# Patient Record
Sex: MALE | Race: WHITE | NOT HISPANIC OR LATINO | Employment: FULL TIME | URBAN - METROPOLITAN AREA
[De-identification: names, ages, dates, MRNs, and addresses within clinical notes are randomized per-mention and may not be internally consistent; named-entity substitution may affect disease eponyms.]

---

## 2018-10-05 ENCOUNTER — OFFICE VISIT (OUTPATIENT)
Dept: FAMILY MEDICINE | Facility: CLINIC | Age: 28
End: 2018-10-05
Payer: COMMERCIAL

## 2018-10-05 VITALS
BODY MASS INDEX: 23.86 KG/M2 | RESPIRATION RATE: 14 BRPM | DIASTOLIC BLOOD PRESSURE: 66 MMHG | HEIGHT: 73 IN | OXYGEN SATURATION: 98 % | HEART RATE: 62 BPM | TEMPERATURE: 98.2 F | WEIGHT: 180 LBS | SYSTOLIC BLOOD PRESSURE: 110 MMHG

## 2018-10-05 DIAGNOSIS — Z00.00 ROUTINE HISTORY AND PHYSICAL EXAMINATION OF ADULT: Primary | ICD-10-CM

## 2018-10-05 DIAGNOSIS — Z23 NEED FOR PROPHYLACTIC VACCINATION AND INOCULATION AGAINST INFLUENZA: ICD-10-CM

## 2018-10-05 PROCEDURE — 99385 PREV VISIT NEW AGE 18-39: CPT | Mod: 25 | Performed by: FAMILY MEDICINE

## 2018-10-05 PROCEDURE — 86780 TREPONEMA PALLIDUM: CPT | Performed by: FAMILY MEDICINE

## 2018-10-05 PROCEDURE — 90686 IIV4 VACC NO PRSV 0.5 ML IM: CPT | Performed by: FAMILY MEDICINE

## 2018-10-05 PROCEDURE — 87389 HIV-1 AG W/HIV-1&-2 AB AG IA: CPT | Performed by: FAMILY MEDICINE

## 2018-10-05 PROCEDURE — 90471 IMMUNIZATION ADMIN: CPT | Performed by: FAMILY MEDICINE

## 2018-10-05 PROCEDURE — 87491 CHLMYD TRACH DNA AMP PROBE: CPT | Performed by: FAMILY MEDICINE

## 2018-10-05 PROCEDURE — 36415 COLL VENOUS BLD VENIPUNCTURE: CPT | Performed by: FAMILY MEDICINE

## 2018-10-05 PROCEDURE — 82947 ASSAY GLUCOSE BLOOD QUANT: CPT | Performed by: FAMILY MEDICINE

## 2018-10-05 PROCEDURE — 80061 LIPID PANEL: CPT | Performed by: FAMILY MEDICINE

## 2018-10-05 PROCEDURE — 87591 N.GONORRHOEAE DNA AMP PROB: CPT | Performed by: FAMILY MEDICINE

## 2018-10-05 NOTE — PATIENT INSTRUCTIONS
Preventive Health Recommendations  Male Ages 26 - 39    Yearly exam:             See your health care provider every year in order to  o   Review health changes.   o   Discuss preventive care.    o   Review your medicines if your doctor has prescribed any.    You should be tested each year for STDs (sexually transmitted diseases), if you re at risk.     After age 35, talk to your provider about cholesterol testing. If you are at risk for heart disease, have your cholesterol tested at least every 5 years.     If you are at risk for diabetes, you should have a diabetes test (fasting glucose).  Shots: Get a flu shot each year. Get a tetanus shot every 10 years.     Nutrition:    Eat at least 5 servings of fruits and vegetables daily.     Eat whole-grain bread, whole-wheat pasta and brown rice instead of white grains and rice.     Get adequate Calcium and Vitamin D.     Lifestyle    Exercise for at least 150 minutes a week (30 minutes a day, 5 days a week). This will help you control your weight and prevent disease.     Limit alcohol to one drink per day.     No smoking.     Wear sunscreen to prevent skin cancer.     See your dentist every six months for an exam and cleaning.   Consider Hep B vaccine if you haven't had it yet.

## 2018-10-05 NOTE — MR AVS SNAPSHOT
After Visit Summary   10/5/2018    Mike Cisneros    MRN: [id number]           Patient Information     Date Of Birth          1990        Visit Information        Provider Department      10/5/2018 2:00 PM Eze Bronson MD [facility]        Today's Diagnoses     Routine history and physical examination of adult    -  1    Need for prophylactic vaccination and inoculation against influenza          Care Instructions      Preventive Health Recommendations  Male Ages 26 - 39    Yearly exam:             See your health care provider every year in order to  o   Review health changes.   o   Discuss preventive care.    o   Review your medicines if your doctor has prescribed any.    You should be tested each year for STDs (sexually transmitted diseases), if you re at risk.     After age 35, talk to your provider about cholesterol testing. If you are at risk for heart disease, have your cholesterol tested at least every 5 years.     If you are at risk for diabetes, you should have a diabetes test (fasting glucose).  Shots: Get a flu shot each year. Get a tetanus shot every 10 years.     Nutrition:    Eat at least 5 servings of fruits and vegetables daily.     Eat whole-grain bread, whole-wheat pasta and brown rice instead of white grains and rice.     Get adequate Calcium and Vitamin D.     Lifestyle    Exercise for at least 150 minutes a week (30 minutes a day, 5 days a week). This will help you control your weight and prevent disease.     Limit alcohol to one drink per day.     No smoking.     Wear sunscreen to prevent skin cancer.     See your dentist every six months for an exam and cleaning.   Consider Hep B vaccine if you haven't had it yet.          Follow-ups after your visit        Who to contact     If you have questions or need follow up information about today's clinic visit or your schedule please contact [facility] directly at  "[phone].  Normal or non-critical lab and imaging results will be communicated to you by MyChart, letter or phone within 4 business days after the clinic has received the results. If you do not hear from us within 7 days, please contact the clinic through [employer] or phone. If you have a critical or abnormal lab result, we will notify you by phone as soon as possible.  Submit refill requests through [employer] or call your pharmacy and they will forward the refill request to us. Please allow 3 business days for your refill to be completed.          Additional Information About Your Visit        [employer] Information     [employer] gives you secure access to your electronic health record. If you see a primary care provider, you can also send messages to your care team and make appointments. If you have questions, please call your primary care clinic.  If you do not have a primary care provider, please call [phone] and they will assist you.        Care EveryWhere ID     This is your Care EveryWhere ID. This could be used by other organizations to access your [place] medical records  [id number]        Your Vitals Were     Pulse Temperature Respirations Height Pulse Oximetry BMI (Body Mass Index)    62 98.2  F (36.8  C) (Oral) 14 6' 1\" (1.854 m) 98% 23.75 kg/m2       Blood Pressure from Last 3 Encounters:   10/05/18 110/66    Weight from Last 3 Encounters:   10/05/18 180 lb (81.6 kg)              We Performed the Following     Chlamydia trachomatis PCR     FLU VACCINE, SPLIT VIRUS, IM (QUADRIVALENT) [11406]- >3 YRS     Glucose     HIV Antigen Antibody Combo     Lipid panel reflex to direct LDL Fasting     Neisseria gonorrhoeae PCR     Treponema Abs w Reflex to RPR and Titer     Vaccine Administration, Initial [[id number]]        Primary Care Provider Office Phone # Fax #    Eze Damon [phone] [address] [facility] [address]        Equal Access to Services     EDGAR JC AH: " Hadii cheryle Chilel, warosaleeda luqadaha, qajaspalta kaflex billynila, luis enrique chuck joshuamanju cervantessabrina michellerenettanasir sánchezraminmanju valencia. So [facility] [phone].    ATENCIÓN: Si habla español, tiene a moran disposición servicios gratuitos de asistencia lingüística. Llame al [phone].    We comply with applicable federal civil rights laws and [place] laws. We do not discriminate on the basis of race, color, national origin, age, disability, sex, sexual orientation, or gender identity.            Thank you!     Thank you for choosing [facility]  for your care. Our goal is always to provide you with excellent care. Hearing back from our patients is one way we can continue to improve our services. Please take a few minutes to complete the written survey that you may receive in the mail after your visit with us. Thank you!             Your Updated Medication List - Protect others around you: Learn how to safely use, store and throw away your medicines at www.disposemymeds.org.      Notice  As of 10/5/2018  4:43 PM    You have not been prescribed any medications.

## 2018-10-05 NOTE — PROGRESS NOTES
SUBJECTIVE:   CC: Mike Cisneros is an 27 year old male who presents for preventative health visit.     Physical   Annual:     Getting at least 3 servings of Calcium per day:  NO    Bi-annual eye exam:  Yes    Dental care twice a year:  Yes    Sleep apnea or symptoms of sleep apnea:  None    Diet:  Regular (no restrictions)    Frequency of exercise:  2-3 days/week    Duration of exercise:  15-30 minutes    Taking medications regularly:  Yes    Medication side effects:  Not applicable and None    Additional concerns today:  YES    Physical Activity: Working a new job. Has gained 7 lbs in the past 3 months. Used to be exercising a lot more than he is now.  Nutrition: Due to work travel demands he has struggled with nutrition.     Today's PHQ-2 Score:   PHQ-2 ( 1999 Pf[employer]) 10/5/2018   Q1: Little interest or pleasure in doing things 0   Q2: Feeling down, depressed or hopeless 1   PHQ-2 Score 1   Q1: Little interest or pleasure in doing things Not at all   Q2: Feeling down, depressed or hopeless Several days   PHQ-2 Score 1       Abuse: Current or Past(Physical, Sexual or Emotional)- No  Do you feel safe in your environment - Yes    Social History   Substance Use Topics     Smoking status: Never Smoker     Smokeless tobacco: Never Used     Alcohol use Yes     Alcohol Use 10/5/2018   If you drink alcohol do you typically have greater than 3 drinks per day OR greater than 7 drinks per week? -   AUDIT SCORE  5       Last PSA: No results found for: PSA    Reviewed orders with patient. Reviewed health maintenance and updated orders accordingly - Yes    Reviewed and updated as needed this visit by clinical staff  Tobacco  Allergies  Meds  Med Hx  Surg Hx  Fam Hx  Soc Hx        Reviewed and updated as needed this visit by Provider          Review of Systems  CONSTITUTIONAL: NEGATIVE for fever, chills, change in weight  INTEGUMENTARY/SKIN: NEGATIVE for worrisome rashes, moles or lesions. 3mm mole on back that is darker  "than others, but regular borders, regular coloring.  EYES: NEGATIVE for vision changes or irritation  ENT: NEGATIVE for ear, mouth and throat problems  RESP: NEGATIVE for significant cough or SOB  CV: NEGATIVE for chest pain, palpitations or peripheral edema  GI: NEGATIVE for nausea, abdominal pain, heartburn, or change in bowel habits   male: negative for dysuria, hematuria, decreased urinary stream, erectile dysfunction, urethral discharge  MUSCULOSKELETAL: NEGATIVE for significant arthralgias or myalgia  NEURO: NEGATIVE for weakness, dizziness or paresthesias  PSYCHIATRIC: NEGATIVE for changes in mood or affect - has had depression and anxiety in past. Citalopram helped in the past, feels he is doing well. Job has helped his mental health he thinks.    OBJECTIVE:   /66 (BP Location: Left arm, Patient Position: Sitting, Cuff Size: Adult Regular)  Pulse 62  Temp 98.2  F (36.8  C) (Oral)  Resp 14  Ht 6' 1\" (1.854 m)  Wt 180 lb (81.6 kg)  SpO2 98%  BMI 23.75 kg/m2    Physical Exam  GENERAL: healthy, alert and no distress  EYES: Eyes grossly normal to inspection, PERRL and conjunctivae and sclerae normal  HENT: ear canals and TM's normal, nose and mouth without ulcers or lesions  NECK: no adenopathy, no asymmetry, masses, or scars and thyroid normal to palpation  RESP: lungs clear to auscultation - no rales, rhonchi or wheezes  CV: regular rate and rhythm, normal S1 S2, no S3 or S4, no murmur, click or rub, no peripheral edema and peripheral pulses strong  ABDOMEN: soft, nontender, no hepatosplenomegaly, no masses and bowel sounds normal   (male): normal male genitalia without lesions or urethral discharge, no hernia  MS: no gross musculoskeletal defects noted, no edema  SKIN: no suspicious lesions or rashes  NEURO: Normal strength and tone, mentation intact and speech normal  PSYCH: mentation appears normal, affect normal/bright      ASSESSMENT/PLAN:   Mike was seen today for physical and flu " "shot.    Diagnoses and all orders for this visit:    Routine history and physical examination of adult  -     Lipid panel reflex to direct LDL Fasting  -     Glucose  -     HIV Antigen Antibody Combo  -     Neisseria gonorrhoeae PCR  -     Chlamydia trachomatis PCR  -     Treponema Abs w Reflex to RPR and Titer    Need for prophylactic vaccination and inoculation against influenza  -     FLU VACCINE, SPLIT VIRUS, IM (QUADRIVALENT) [84458]- >3 YRS  -     Vaccine Administration, Initial [[id number]]        COUNSELING:   Reviewed preventive health counseling, as reflected in patient instructions       Regular exercise       Healthy diet/nutrition    BP Readings from Last 1 Encounters:   10/05/18 110/66     Estimated body mass index is 23.75 kg/(m^2) as calculated from the following:    Height as of this encounter: 6' 1\" (1.854 m).    Weight as of this encounter: 180 lb (81.6 kg).    Consider Hep B vaccine if you haven't had it yet.       reports that he has never smoked. He has never used smokeless tobacco.      Counseling Resources:  ATP IV Guidelines  Pooled Cohorts Equation Calculator  FRAX Risk Assessment  ICSI Preventive Guidelines  Dietary Guidelines for Americans, 2010  [employer] MyPlate  ASA Prophylaxis  Lung CA Screening    Eze Damon MD  [facility]  Answers for HPI/ROS submitted by the patient on 10/5/2018   PHQ-2 Score: 1    "

## 2018-10-05 NOTE — PROGRESS NOTES

## 2018-10-06 LAB
CHOLEST SERPL-MCNC: 183 MG/DL
GLUCOSE SERPL-MCNC: 82 MG/DL (ref 70–99)
HDLC SERPL-MCNC: 46 MG/DL
LDLC SERPL CALC-MCNC: 125 MG/DL
NONHDLC SERPL-MCNC: 137 MG/DL
T PALLIDUM AB SER QL: NONREACTIVE
TRIGL SERPL-MCNC: 58 MG/DL

## 2018-10-06 ASSESSMENT — PATIENT HEALTH QUESTIONNAIRE - PHQ9: SUM OF ALL RESPONSES TO PHQ QUESTIONS 1-9: 4

## 2018-10-07 LAB
C TRACH DNA SPEC QL NAA+PROBE: NEGATIVE
N GONORRHOEA DNA SPEC QL NAA+PROBE: NEGATIVE
SPECIMEN SOURCE: NORMAL
SPECIMEN SOURCE: NORMAL

## 2018-10-08 LAB — HIV 1+2 AB+HIV1 P24 AG SERPL QL IA: NONREACTIVE

## 2018-11-26 ENCOUNTER — MEDICAL CORRESPONDENCE (OUTPATIENT)
Dept: HEALTH INFORMATION MANAGEMENT | Facility: CLINIC | Age: 28
End: 2018-11-26

## 2019-01-07 ENCOUNTER — TELEPHONE (OUTPATIENT)
Dept: FAMILY MEDICINE | Facility: CLINIC | Age: 29
End: 2019-01-07

## 2019-01-07 ENCOUNTER — OFFICE VISIT (OUTPATIENT)
Dept: URGENT CARE | Facility: URGENT CARE | Age: 29
End: 2019-01-07
Payer: COMMERCIAL

## 2019-01-07 VITALS
HEART RATE: 73 BPM | DIASTOLIC BLOOD PRESSURE: 68 MMHG | TEMPERATURE: 97.4 F | OXYGEN SATURATION: 99 % | SYSTOLIC BLOOD PRESSURE: 112 MMHG

## 2019-01-07 DIAGNOSIS — B27.90 MONONUCLEOSIS: Primary | ICD-10-CM

## 2019-01-07 LAB
BASOPHILS # BLD AUTO: 0 10E9/L (ref 0–0.2)
BASOPHILS NFR BLD AUTO: 0.4 %
DEPRECATED S PYO AG THROAT QL EIA: NORMAL
DIFFERENTIAL METHOD BLD: ABNORMAL
EOSINOPHIL # BLD AUTO: 0 10E9/L (ref 0–0.7)
EOSINOPHIL NFR BLD AUTO: 0.3 %
ERYTHROCYTE [DISTWIDTH] IN BLOOD BY AUTOMATED COUNT: 12.4 % (ref 10–15)
HCT VFR BLD AUTO: 37.4 % (ref 40–53)
HETEROPH AB SER QL: POSITIVE
HGB BLD-MCNC: 12.7 G/DL (ref 13.3–17.7)
LYMPHOCYTES # BLD AUTO: 5.2 10E9/L (ref 0.8–5.3)
LYMPHOCYTES NFR BLD AUTO: 48.2 %
MCH RBC QN AUTO: 29.2 PG (ref 26.5–33)
MCHC RBC AUTO-ENTMCNC: 34 G/DL (ref 31.5–36.5)
MCV RBC AUTO: 86 FL (ref 78–100)
MONOCYTES # BLD AUTO: 1.6 10E9/L (ref 0–1.3)
MONOCYTES NFR BLD AUTO: 14.9 %
NEUTROPHILS # BLD AUTO: 3.9 10E9/L (ref 1.6–8.3)
NEUTROPHILS NFR BLD AUTO: 36.2 %
PLATELET # BLD AUTO: 171 10E9/L (ref 150–450)
RBC # BLD AUTO: 4.35 10E12/L (ref 4.4–5.9)
SPECIMEN SOURCE: NORMAL
WBC # BLD AUTO: 10.7 10E9/L (ref 4–11)

## 2019-01-07 PROCEDURE — 87081 CULTURE SCREEN ONLY: CPT | Performed by: PHYSICIAN ASSISTANT

## 2019-01-07 PROCEDURE — 99213 OFFICE O/P EST LOW 20 MIN: CPT | Performed by: PHYSICIAN ASSISTANT

## 2019-01-07 PROCEDURE — 86308 HETEROPHILE ANTIBODY SCREEN: CPT | Performed by: PHYSICIAN ASSISTANT

## 2019-01-07 PROCEDURE — 36415 COLL VENOUS BLD VENIPUNCTURE: CPT | Performed by: PHYSICIAN ASSISTANT

## 2019-01-07 PROCEDURE — 85025 COMPLETE CBC W/AUTO DIFF WBC: CPT | Performed by: PHYSICIAN ASSISTANT

## 2019-01-07 PROCEDURE — 87880 STREP A ASSAY W/OPTIC: CPT | Performed by: PHYSICIAN ASSISTANT

## 2019-01-07 NOTE — TELEPHONE ENCOUNTER
S-(situation): patient calling for appointment     B-(background): sore throat onset 1 1/2 weeks ago     A-(assessment):   Sore throat - painful to swallow however is able to swallow   No breathing difficulties   Fatigue   Swollen glands neck/throat   Started with fever 1 and 1/2 weeks ago this seems to have resolved   Taking ibuprofen for symptoms   Patient would like mono test     R-(recommendations): No appointments open in clinic today   Patient will f/u in urgent care setting today - either [place] or [place], does not wish to wait for [facility] to open at 5:00    Robina Kolb, Registered Nurse   [facility]

## 2019-01-07 NOTE — PROGRESS NOTES
SUBJECTIVE:  Mike Cisneros is a 28 year old male with a chief complaint of sore throat.  Onset of symptoms was 10 day(s) ago.    Course of illness: worsening.  Severity moderate  Current and Associated symptoms: fever started a couple days ago. Endorses fatigue  Treatment measures tried include Tylenol/Ibuprofen.  Predisposing factors include None.  Patient denies inability to swallow liquids, drooling, no breathing difficulties or unable to open mouth.     Past Medical History:   Diagnosis Date     Anxiety and depression      No current outpatient medications on file.     Social History     Tobacco Use     Smoking status: Never Smoker     Smokeless tobacco: Never Used   Substance Use Topics     Alcohol use: Yes       ROS:  Review of systems otherwise negative except as stated above.    OBJECTIVE:   /68 (BP Location: Right arm, Patient Position: Chair, Cuff Size: Adult Regular)   Pulse 73   Temp 97.4  F (36.3  C) (Tympanic)   SpO2 99%   GENERAL APPEARANCE: healthy, alert and no distress  EYES: EOMI,  PERRL, conjunctiva clear  HENT: ear canals and TM's normal.  Nose normal.  Pharynx erythematous with some exudate noted.  NECK: supple. Shotty lymphnodes B/L R>L  RESP: lungs clear to auscultation - no rales, rhonchi or wheezes  CV: regular rates and rhythm, normal S1 S2, no murmur noted  ABDOMEN:  soft, nontender, no HSM or masses and bowel sounds normal  SKIN: no suspicious lesions or rashes    Results for orders placed or performed in visit on 01/07/19   CBC with platelets and differential   Result Value Ref Range    WBC 10.7 4.0 - 11.0 10e9/L    RBC Count 4.35 (L) 4.4 - 5.9 10e12/L    Hemoglobin 12.7 (L) 13.3 - 17.7 g/dL    Hematocrit 37.4 (L) 40.0 - 53.0 %    MCV 86 78 - 100 fl    MCH 29.2 26.5 - 33.0 pg    MCHC 34.0 31.5 - 36.5 g/dL    RDW 12.4 10.0 - 15.0 %    Platelet Count 171 150 - 450 10e9/L    Diff Method Automated Method     % Neutrophils 36.2 %    % Lymphocytes 48.2 %    % Monocytes 14.9 %    %  Eosinophils 0.3 %    % Basophils 0.4 %    Absolute Neutrophil 3.9 1.6 - 8.3 10e9/L    Absolute Lymphocytes 5.2 0.8 - 5.3 10e9/L    Absolute Monocytes 1.6 (H) 0.0 - 1.3 10e9/L    Absolute Eosinophils 0.0 0.0 - 0.7 10e9/L    Absolute Basophils 0.0 0.0 - 0.2 10e9/L   Mononucleosis screen   Result Value Ref Range    Mononucleosis Screen Positive (A) NEG^Negative   Strep, Rapid Screen   Result Value Ref Range    Specimen Description Throat     Rapid Strep A Screen       NEGATIVE: No Group A streptococcal antigen detected by immunoassay, await culture report.         ASSESSMENT / PLAN:  1. Mononucleosis  Symptomatic treat with gargles, lozenges, and OTC analgesic as needed. Follow-up with primary clinic if not improving in 2-3 weeks. Avoid contact sports for the next 4 weeks. Dicussed course of illness with mononucleosis.     I have discussed the patient's diagnosis and my plan of treatment with the patient. We went over any labs or imaging. Patient is aware to come back in with worsening symptoms or if no relief despite treatment plan.  Patient verbalizes understanding. All questions were addressed and answered.   Fiona Acosta PA-C

## 2019-01-07 NOTE — LETTER
[facility]  [address]  [address]  [place] [address]  Phone: [phone]  Fax: [phone]    January 7, 2019        Mike Cisneros  [address]  [address]  [place] [address]          To whom it may concern:    RE: Mike Cisneros    Patient was seen and treated today at our clinic. Please refrain from airline travel from dates 1/7/2018 - 1/15/2018.     Please contact me for questions or concerns.      Sincerely,        Fiona Acosta PA-C

## 2019-01-07 NOTE — PATIENT INSTRUCTIONS
nal medical care. Always follow your healthcare professional's instructions.           Patient Education     Mononucleosis  Mononucleosis (also called mono) is a contagious viral infection. Most infants and children exposed to the virus get only mild flu-like symptoms or no symptoms at all. However, infection is usually more serious in teens and young adults. While the virus is active, it causes symptoms and can spread to others. After symptoms subside, the virus stays in the body and eventually becomes inactive. The virus can reactivate and develop symptoms, especially in people with weak immune systems.   The virus is usually spread by contact with saliva, often by kissing, or sharing food or eating utensils. It may also spread by breastmilk, blood, or sexual contact. It takes about 4 to 6 weeks to develop symptoms after exposure.  Early symptoms include headache, nausea, tiredness and general muscle aching. This is followed by sore throat and fever. Lymph glands in the neck, under the arms, or in the groin may be swollen. Symptoms usually go away in about 1 to 2 months. But they can last up to 4 months.  In the first few days to weeks, a common blood test (the monospot test) used to diagnose this disease may be negative even though you have the illness. In this case, other tests may be done.  Taking the antibiotics ampicillin or amoxicillin during a mono infection may cause a skin rash. This is not serious and will fade in about a week. The rash may not mean you are having an allergic reaction to the antibiotic.   Mono can cause your spleen to swell. The spleen is a fist-sized organ in the upper left abdomen that stores red blood cells. Injury to a swollen spleen can cause the spleen to rupture. This can cause life-threatening internal bleeding. To prevent this from happening, don't play contact sports or do strenuous activity for 8 weeks, or until your healthcare provider says it's OK. A sharp blow or pressure  to the area could rupture a swollen spleen  Home care    Rest in bed until the fever and weakness have gone away.    Drink plenty of fluids, but don't drink alcohol. Otherwise, you may eat a regular diet.    Ask your healthcare provider about using over-the-counter medicines to treat symptoms such as fever, pain, or an itchy rash.    Over-the-counter throat lozenges may help soothe a sore throat. Gargling with warm salt water (1/2 teaspoon in 1 glass of warm water) may also be soothing to the throat.    You may return to work or school after the fever goes away and you are feeling better. Continue to follow any activity restrictions you have been given.  Preventing spread of the virus  To limit the spread of the virus, don't expose others to your saliva for at least 6 months after your illness (no kissing or sharing utensils, drinking glasses, or toothbrushes).  Follow-up care  Follow up with your healthcare provider within 1 to 2 weeks, or as advised to be sure that there are no complications. If symptoms of extreme fatigue and swollen glands last longer than 6 months, see your healthcare provider for further testing.  When to seek medical advice  Call your healthcare provider right away if any of the following occur:    Excessive coughing    Yellow skin or eyes    Trouble swallowing    Dizziness    Paleness   Call 911  Call 911 if any of the following occur:    Severe or worsening abdominal pain    Trouble breathing   Date Last Reviewed: 3/1/2018    2912-9640 [employer]. [address] [place] [address], [place] [address]. All rights reserved. This information is not intended as a substitute for professional medical care. Always follow your healthcare professional's instructions.

## 2019-01-08 LAB
BACTERIA SPEC CULT: NORMAL
SPECIMEN SOURCE: NORMAL

## 2019-01-09 ENCOUNTER — OFFICE VISIT (OUTPATIENT)
Dept: FAMILY MEDICINE | Facility: CLINIC | Age: 29
End: 2019-01-09
Payer: COMMERCIAL

## 2019-01-09 VITALS
OXYGEN SATURATION: 97 % | WEIGHT: 180 LBS | BODY MASS INDEX: 23.75 KG/M2 | HEART RATE: 60 BPM | SYSTOLIC BLOOD PRESSURE: 100 MMHG | TEMPERATURE: 98.4 F | RESPIRATION RATE: 16 BRPM | DIASTOLIC BLOOD PRESSURE: 62 MMHG

## 2019-01-09 DIAGNOSIS — B27.90 MONONUCLEOSIS: Primary | ICD-10-CM

## 2019-01-09 PROCEDURE — 99214 OFFICE O/P EST MOD 30 MIN: CPT | Performed by: FAMILY MEDICINE

## 2019-01-09 PROCEDURE — 36415 COLL VENOUS BLD VENIPUNCTURE: CPT | Performed by: FAMILY MEDICINE

## 2019-01-09 PROCEDURE — 80053 COMPREHEN METABOLIC PANEL: CPT | Performed by: FAMILY MEDICINE

## 2019-01-09 RX ORDER — IBUPROFEN 200 MG
600 TABLET ORAL EVERY 6 HOURS PRN
COMMUNITY
End: 2022-04-14

## 2019-01-09 NOTE — PROGRESS NOTES
SUBJECTIVE:   Mike Cisneros is a 28 year old male who presents to clinic today for the following health issues:      ED/UC Followup:    Facility:  [facility]  Date of visit: 01/07/19  Reason for visit: sore throat. Positive for mononucleosis   Current Status: not improved. Symptoms worsened       Was diagnosed with [place] in UC.  He feels worse since that time.  Has been taking ibuprofen every 6 hours. His throat is really sore and it is difficult to swallow.  Wakes up every couple of hours in the night and is drenched in sweat    Appetite is down.    He would like some advice on course to expect and also how to best manage symptoms.    ROS  No nausea  No abdominal pain    EXAM:  /62   Pulse 60   Temp 98.4  F (36.9  C)   Resp 16   Wt 81.6 kg (180 lb)   SpO2 97%   BMI 23.75 kg/m    Constitutional: Healthy, alert, no distress   EENT: enlarged tonsils with exudate notably on right, TMs clear  Cardiovascular: RRR. No murmurs   Respiratory: Clear to auscultation   Gastrointestinal: Bowel sounds active, no masses, rebound, guarding or organomegally, no masses appreciated  Skin: slight jaundice      ASSESSMENT    ICD-10-CM    1. Mononucleosis B27.90 Comprehensive metabolic panel (BMP + Alb, Alk Phos, ALT, AST, Total. Bili, TP)     lidocaine VISCOUS (XYLOCAINE) 2 % solution      Plan:  Patient Instructions   Reviewed symptom care  Follow-up in 1 week to consider return to work at that time.     Will check CMP for concern of liver function affects that can be seen with mono given exam today.    Return in about 1 week (around 1/16/2019) for Routine Visit.    Eze Damon MD  Family Medicine Physician

## 2019-01-10 LAB
ALBUMIN SERPL-MCNC: 3.9 G/DL (ref 3.4–5)
ALP SERPL-CCNC: 69 U/L (ref 40–150)
ALT SERPL W P-5'-P-CCNC: 67 U/L (ref 0–70)
ANION GAP SERPL CALCULATED.3IONS-SCNC: 6 MMOL/L (ref 3–14)
AST SERPL W P-5'-P-CCNC: 18 U/L (ref 0–45)
BILIRUB SERPL-MCNC: 0.6 MG/DL (ref 0.2–1.3)
BUN SERPL-MCNC: 14 MG/DL (ref 7–30)
CALCIUM SERPL-MCNC: 9.2 MG/DL (ref 8.5–10.1)
CHLORIDE SERPL-SCNC: 107 MMOL/L (ref 94–109)
CO2 SERPL-SCNC: 26 MMOL/L (ref 20–32)
CREAT SERPL-MCNC: 1.03 MG/DL (ref 0.66–1.25)
GFR SERPL CREATININE-BSD FRML MDRD: >90 ML/MIN/{1.73_M2}
GLUCOSE SERPL-MCNC: 87 MG/DL (ref 70–99)
POTASSIUM SERPL-SCNC: 4.5 MMOL/L (ref 3.4–5.3)
PROT SERPL-MCNC: 7.3 G/DL (ref 6.8–8.8)
SODIUM SERPL-SCNC: 139 MMOL/L (ref 133–144)

## 2019-01-11 ENCOUNTER — MYC MEDICAL ADVICE (OUTPATIENT)
Dept: FAMILY MEDICINE | Facility: CLINIC | Age: 29
End: 2019-01-11

## 2019-01-11 NOTE — TELEPHONE ENCOUNTER
Triaged another message patient wanted to document that his urine is somewhat cloudy and darker in color.    He feels he is drinking enough fluids but nurse encouraged more.  No hematuria. No abdominal pain or painful urination.  Labs were checked and normal on 1/9.  Fever low 99. Takes IBP.  Plan:  Increase fluids. Monitor for any painful abdomen or urination and if positive sx will go to . Will call with updates on Monday.   Has mono diagnosed 1/9.  Yen Shahid RN

## 2019-01-11 NOTE — LETTER
[address] [address]  Phone: [phone]    January 14, 2019        Mike Cisneros  [address]  [address]  [place] [address]          To whom it may concern:    RE: Mike Cisneros    Patient was seen and treated on 1/9/2010 and advised not to return to work until symptoms allow. Anticipated date of return of 1/17/2019 pending improvement.       Sincerely,        Eze Damon MD

## 2019-01-11 NOTE — TELEPHONE ENCOUNTER
Rain Curran CNP/DOD for Dr Monteiro   Ok to write this letter and schedule follow up with Dr Monteiro for this pt on 1/16?    Thanks!     Sheree Little RN

## 2019-01-16 ENCOUNTER — OFFICE VISIT (OUTPATIENT)
Dept: FAMILY MEDICINE | Facility: CLINIC | Age: 29
End: 2019-01-16
Payer: COMMERCIAL

## 2019-01-16 VITALS
RESPIRATION RATE: 14 BRPM | HEART RATE: 76 BPM | TEMPERATURE: 97.6 F | WEIGHT: 175 LBS | OXYGEN SATURATION: 98 % | SYSTOLIC BLOOD PRESSURE: 100 MMHG | BODY MASS INDEX: 23.7 KG/M2 | DIASTOLIC BLOOD PRESSURE: 62 MMHG | HEIGHT: 72 IN

## 2019-01-16 DIAGNOSIS — B27.90 MONONUCLEOSIS SYNDROME: Primary | ICD-10-CM

## 2019-01-16 PROCEDURE — 99213 OFFICE O/P EST LOW 20 MIN: CPT | Performed by: FAMILY MEDICINE

## 2019-01-16 ASSESSMENT — MIFFLIN-ST. JEOR: SCORE: 1801.79

## 2019-01-16 NOTE — PROGRESS NOTES
SUBJECTIVE:   Mike Cisneros is a 28 year old male who presents to clinic today for the following health issues:      Mononucleosis Follow -up      Patient feels much improved. Had continued nightsweats.    Continues to have productive phlegm, discolored.    ROS  No abdominal pain  No nausea  No fever  Some sore throat    EXAM:  /62   Pulse 76   Temp 97.6  F (36.4  C)   Resp 14   Ht 1.829 m (6')   Wt 79.4 kg (175 lb)   SpO2 98%   BMI 23.73 kg/m    Constitutional: Healthy, alert, no distress   ENT: PERRL, TM's clear bilaterally, oropharynx with minimal erythema, no anterior cervical lymphadenopathy   Cardiovascular: RRR. No murmurs   Respiratory: Clear to auscultation   GI: Bowel sounds active, no masses, rebound, guarding or organomegally     ASSESSMENT    ICD-10-CM    1. Mononucleosis syndrome B27.90       Plan:  Patient Instructions   Refrain from activities that could lead to abdominal injury until at least February.  Ok to return to work as tolerated.     Reviewed anticipated course of illlness/recovery with relapses/remission episodes over coming weeks/months.    Return in about 1 year (around 1/16/2020), or if symptoms worsen or fail to improve, for Physical Exam.    Eze Damon MD  Family Medicine Physician

## 2019-01-16 NOTE — PATIENT INSTRUCTIONS
Refrain from activities that could lead to abdominal injury until at least February.  Ok to return to work as tolerated.

## 2019-02-01 ENCOUNTER — OFFICE VISIT (OUTPATIENT)
Dept: FAMILY MEDICINE | Facility: CLINIC | Age: 29
End: 2019-02-01
Payer: COMMERCIAL

## 2019-02-01 VITALS
OXYGEN SATURATION: 98 % | DIASTOLIC BLOOD PRESSURE: 69 MMHG | TEMPERATURE: 98 F | SYSTOLIC BLOOD PRESSURE: 115 MMHG | WEIGHT: 173 LBS | RESPIRATION RATE: 16 BRPM | BODY MASS INDEX: 23.46 KG/M2 | HEART RATE: 82 BPM

## 2019-02-01 DIAGNOSIS — R21 RASH AND NONSPECIFIC SKIN ERUPTION: Primary | ICD-10-CM

## 2019-02-01 DIAGNOSIS — B27.90 MONONUCLEOSIS SYNDROME: ICD-10-CM

## 2019-02-01 DIAGNOSIS — B35.6 TINEA CRURIS: ICD-10-CM

## 2019-02-01 PROBLEM — F32.A ANXIETY AND DEPRESSION: Status: ACTIVE | Noted: 2017-12-08

## 2019-02-01 PROBLEM — F32.A ANXIETY AND DEPRESSION: Status: RESOLVED | Noted: 2017-12-08 | Resolved: 2019-02-01

## 2019-02-01 PROBLEM — H18.603 KERATOCONUS OF BOTH EYES: Status: ACTIVE | Noted: 2017-12-08

## 2019-02-01 PROBLEM — F41.9 ANXIETY AND DEPRESSION: Status: RESOLVED | Noted: 2017-12-08 | Resolved: 2019-02-01

## 2019-02-01 PROBLEM — F41.9 ANXIETY AND DEPRESSION: Status: ACTIVE | Noted: 2017-12-08

## 2019-02-01 PROCEDURE — 99214 OFFICE O/P EST MOD 30 MIN: CPT | Performed by: FAMILY MEDICINE

## 2019-02-01 RX ORDER — PRENATAL VIT 91/IRON/FOLIC/DHA 28-975-200
COMBINATION PACKAGE (EA) ORAL 2 TIMES DAILY
Qty: 42 G | Refills: 0 | Status: SHIPPED | OUTPATIENT
Start: 2019-02-01 | End: 2020-02-01

## 2019-02-01 NOTE — PATIENT INSTRUCTIONS
Suspect dry skin and flaking post illness in humid area but will treat for jock itch which can also appear similar from humidity   lamisil twice a day till 1 month after resolution  If gets worse or has new symptoms see Derm  does not look like an STD  Mono much better       Patient Education     Jock Itch (Tinea Cruris, General)  Jock itch (tinea cruris) is a red, itchy rash in the groin caused by a fungal infection. It occurs in skin folds where it is warm and moist. It commonly starts as a small, red, itchy patch that grows larger. The patch is usually in the shape of a round ring, 1 to 2 inches wide. It may cause the skin to flake. It may also spread to the scrotum or the skin that covers your testicles. This infection is treated with skin creams or oral medicine.  Home care    If you were prescribed a cream, use it exactly as directed. You can buy some antifungal creams without a prescription.    It may take a week before the fungus starts to go away. It can take about 2 to 3 weeks to completely clear. To stop the rash from coming back, keep using the medicine until the rash is all gone.    Wash the area at least once a day with soap and water. Pat dry and apply medicine.     Wear loose-fitting underwear to let your skin breathe. Change your underwear daily.    Once the rash is gone, keep the area clean and dry to prevent reinfection. If recurrence is a problem, use a medicated antifungal powder daily. This is available over the counter.  Prevention  The following tips may help prevent jock itch:    Don't share clothes, towels, or sports gear with others unless they have been washed.    Change your underwear daily.    Keep skin clean and dry, especially after showering or swimming.    Lose weight.    Do not wear tight underwear.    Treat athlete's foot if it occurs.  Follow-up care  Follow up with your healthcare provider, or as advised. Call your provider if the rash is not starting to improve after 10 days of  treatment, or if the rash continues to spread.  When to seek medical advice  Call your healthcare provider right away if any of these occur:    Increasing pain in the rash area    Redness that spreads around the rash    Fluid draining from the rash    Rash returns soon after treatment    Fever of 100.4 F (38 C) or higher, or as directed by your provider  Date Last Reviewed: 8/1/2016 2000-2018 [employer]. [address] [place] [address], [place] [address]. All rights reserved. This information is not intended as a substitute for professional medical care. Always follow your healthcare professional's instructions.

## 2019-02-01 NOTE — PROGRESS NOTES
SUBJECTIVE:   Mike Cisneros is a 28 year old male who presents to clinic today for the following health issues:    Rash      Duration: 2 weeks     Description  Location: both inner groin   Itching: no    Intensity:  mild    Accompanying signs and symptoms:  2 inches long, redness and dry skin    History (similar episodes/previous evaluation): None    Precipitating or alleviating factors:  New exposures:  None  Recent travel: YES-     2 weeks     Therapies tried and outcome: none  First noted 21 st to 25 th early in that week, freaked him out, worried about std. Has bene travelling a lot, new under wear but washed before wearing.   mono better     Hx of anxiety, prior depression, previously on lithium, hx of keratoconus B/l eyes wears glasses, prior nasal fracture s/P closed reduction & rhinoplasty, hx of neurally mediated syncope, migraines, prior clavicle fracture, seen 1/16 by PCP Dr Ford for follow up of mononucleosis diagnosed on the 7th & noted was improved.    No fever or chills, no headache or dizziness, no double or blurry vision, no facial pain, earache, sore throat, runny nose, post nasal drip, no trouble hearing, smelling, tasting or swallowing, no cough , no chest pain, trouble breathing or palpitations, No abdominal pain, heart burn, reflux, nausea or vomiting or diarrhea or constipation, no blood in stools or black stools, no weight loss or night sweats. No dysuria, hematuria, frequency, urgency, hesitancy, incontinence, No pelvic complaints. No leg swelling or joint pain.     Problem list and histories reviewed & adjusted, as indicated.  Additional history: as documented    Patient Active Problem List   Diagnosis     Keratoconus of both eyes     Past Surgical History:   Procedure Laterality Date     RHINOPLASTY Bilateral 2008       Social History     Tobacco Use     Smoking status: Never Smoker     Smokeless tobacco: Never Used   Substance Use Topics     Alcohol use: Yes     Family History    Problem Relation Age of Onset     Other Cancer Maternal Grandmother      Other Cancer Paternal Grandmother      Other Cancer Sister          Current Outpatient Medications   Medication Sig Dispense Refill     terbinafine (LAMISIL) 1 % external cream Apply topically 2 times daily To groin rash up to 1 month after it goes away 42 g 0     ibuprofen (ADVIL/MOTRIN) 200 MG tablet Take 600 mg by mouth every 6 hours as needed for mild pain       No Known Allergies  Recent Labs   Lab Test 01/09/19  1516 10/05/18  1517   LDL  --  125*   HDL  --  46   TRIG  --  58   ALT 67  --    CR 1.03  --    GFRESTIMATED >90  --    GFRESTBLACK >90  --    POTASSIUM 4.5  --       BP Readings from Last 3 Encounters:   02/01/19 115/69   01/16/19 100/62   01/09/19 100/62    Wt Readings from Last 3 Encounters:   02/01/19 78.5 kg (173 lb)   01/16/19 79.4 kg (175 lb)   01/09/19 81.6 kg (180 lb)                  Labs reviewed in EPIC    Reviewed and updated as needed this visit by clinical staff       Reviewed and updated as needed this visit by Provider         ROS:  Constitutional, HEENT, cardiovascular, pulmonary, GI, , musculoskeletal, neuro, skin, endocrine and psych systems are negative, except as otherwise noted.    OBJECTIVE:     /69 (BP Location: Left arm, Patient Position: Chair, Cuff Size: Adult Regular)   Pulse 82   Temp 98  F (36.7  C) (Oral)   Resp 16   Wt 78.5 kg (173 lb)   SpO2 98%   BMI 23.46 kg/m    Body mass index is 23.46 kg/m .  GENERAL: healthy, alert and no distress  EYES: Eyes grossly normal to inspection, PERRL and conjunctivae and sclerae normal  RESP: lungs clear to auscultation - no rales, rhonchi or wheezes  CV: regular rate and rhythm, normal S1 S2, no S3 or S4, no murmur, click or rub, no peripheral edema and peripheral pulses strong  ABDOMEN: soft, non tender, no hepatosplenomegaly, no masses and bowel sounds normal  MS: no gross musculoskeletal defects noted, no edema  SKIN: bilateral inner thighs  upper thighs near inguinal folds, erythematous non blanching with peeling skin, some chafing noted.   NEURO: Normal strength and tone, mentation intact and speech normal  PSYCH: mentation appears normal, affect normal/bright    Diagnostic Test Results:  No results found for this or any previous visit (from the past 24 hour(s)).    ASSESSMENT/PLAN:       ICD-10-CM    1. Rash and nonspecific skin eruption R21 DERMATOLOGY REFERRAL   2. Tinea cruris B35.6 terbinafine (LAMISIL) 1 % external cream   3. Mononucleosis syndrome B27.90      Suspect dry skin and flaking post illness in humid area but will treat for jock itch which can also appear similar from humidity   Lamisil twice a day till 1 month after resolution  If gets worse or has new symptoms see Derm  does not look like an STD  Mono much better and unlikely cause of symptoms  See Patient Instructions    Joselin Lopez MD  [facility] [place]

## 2019-11-07 ENCOUNTER — HEALTH MAINTENANCE LETTER (OUTPATIENT)
Age: 29
End: 2019-11-07

## 2020-10-01 ENCOUNTER — OFFICE VISIT (OUTPATIENT)
Dept: FAMILY MEDICINE | Facility: CLINIC | Age: 30
End: 2020-10-01
Payer: COMMERCIAL

## 2020-10-01 VITALS
BODY MASS INDEX: 20.53 KG/M2 | SYSTOLIC BLOOD PRESSURE: 112 MMHG | DIASTOLIC BLOOD PRESSURE: 69 MMHG | OXYGEN SATURATION: 96 % | RESPIRATION RATE: 15 BRPM | HEART RATE: 80 BPM | HEIGHT: 74 IN | WEIGHT: 160 LBS | TEMPERATURE: 97.4 F

## 2020-10-01 DIAGNOSIS — R63.4 UNINTENTIONAL WEIGHT LOSS: ICD-10-CM

## 2020-10-01 DIAGNOSIS — Z00.00 ROUTINE GENERAL MEDICAL EXAMINATION AT A HEALTH CARE FACILITY: Primary | ICD-10-CM

## 2020-10-01 DIAGNOSIS — Z11.3 SCREEN FOR STD (SEXUALLY TRANSMITTED DISEASE): ICD-10-CM

## 2020-10-01 DIAGNOSIS — Z13.220 SCREENING FOR LIPID DISORDERS: ICD-10-CM

## 2020-10-01 DIAGNOSIS — Z23 NEED FOR VACCINATION: ICD-10-CM

## 2020-10-01 LAB
% GRANULOCYTES: 69.7 %G (ref 40–75)
BUN SERPL-MCNC: 17 MG/DL (ref 5–24)
CALCIUM SERPL-MCNC: 9.1 MG/DL (ref 8.5–10.4)
CHLORIDE SERPLBLD-SCNC: 102 MMOL/L (ref 94–109)
CHOLEST SERPL-MCNC: 159 MG/DL (ref 0–200)
CHOLEST/HDLC SERPL: 3.1 {RATIO} (ref 0–5)
CO2 SERPL-SCNC: 31 MMOL/L (ref 20–32)
CREAT SERPL-MCNC: 1.1 MG/DL (ref 0.8–1.5)
EGFR CALCULATED (BLACK REFERENCE): 101.8
EGFR CALCULATED (NON BLACK REFERENCE): 84.1
ERYTHROCYTE [DISTWIDTH] IN BLOOD BY AUTOMATED COUNT: 12 %
FASTING SPECIMEN: NO
GLUCOSE SERPL-MCNC: 97 MG/DL (ref 60–99)
GRANULOCYTES #: 5.4 K/UL (ref 1.6–8.3)
HCT VFR BLD AUTO: 43.2 % (ref 40–53)
HDLC SERPL-MCNC: 51 MG/DL
HEMOGLOBIN: 15.1 G/DL (ref 13.3–17.7)
LDLC SERPL CALC-MCNC: 89 MG/DL (ref 0–129)
LYMPHOCYTES # BLD AUTO: 1.8 K/UL (ref 0.8–5.3)
LYMPHOCYTES NFR BLD AUTO: 23.5 %L (ref 20–48)
MCH RBC QN AUTO: 29 PG (ref 26.5–35)
MCHC RBC AUTO-ENTMCNC: 35 G/DL (ref 32–36)
MCV RBC AUTO: 83.1 FL (ref 78–100)
MID #: 0.5 K/UL (ref 0–2.2)
MID %: 6.8 %M (ref 0–20)
PLATELET # BLD AUTO: 224 K/UL (ref 150–450)
POTASSIUM SERPL-SCNC: 4.1 MMOL/L (ref 3.4–5.3)
RBC # BLD AUTO: 5.2 M/UL (ref 4.4–5.9)
SODIUM SERPL-SCNC: 142 MMOL/L (ref 137.3–146.3)
TRIGL SERPL-MCNC: 97 MG/DL (ref 0–150)
TSH SERPL DL<=0.005 MIU/L-ACNC: 1.47 MU/L (ref 0.4–4)
VLDL-CHOLESTEROL: 19 (ref 7–32)
WBC # BLD AUTO: 7.7 K/UL (ref 4–11)

## 2020-10-01 SDOH — HEALTH STABILITY: MENTAL HEALTH: HOW MANY DRINKS CONTAINING ALCOHOL DO YOU HAVE ON A TYPICAL DAY WHEN YOU ARE DRINKING?: 3 OR 4

## 2020-10-01 SDOH — HEALTH STABILITY: MENTAL HEALTH: HOW OFTEN DO YOU HAVE A DRINK CONTAINING ALCOHOL?: 4 OR MORE TIMES A WEEK

## 2020-10-01 SDOH — HEALTH STABILITY: MENTAL HEALTH: HOW OFTEN DO YOU HAVE SIX OR MORE DRINKS ON ONE OCCASION?: MONTHLY

## 2020-10-01 ASSESSMENT — MIFFLIN-ST. JEOR: SCORE: 1762

## 2020-10-01 NOTE — NURSING NOTE
"Injectable Influenza Immunization Documentation    1.  Has the patient received the information for the injectable influenza vaccine? YES     2. Is the patient 6 months of age or older? YES     3. Does the patient have any of the following contraindications?         Severe allergy to eggs? No     Severe allergic reaction to previous influenza vaccines? No   Severe allergy to latex? No       History of Guillain-[place] syndrome? No     Currently have a temperature greater than 100.4F? No        4.  Severely egg allergic patients should have flu vaccine eligibility assessed by an MD, RN, or pharmacist, and those who received flu vaccine should be observed for 15 min by an MD, RN, Pharmacist, Medical Technician, or member of clinic staff.\": YES    5. Latex-allergic patients should be given latex-free influenza vaccine Yes. Please reference the Vaccine latex table to determine if your clinic s product is latex-containing.       Vaccination given by Mami Wallis CMA          "

## 2020-10-01 NOTE — NURSING NOTE
"29 year old  Chief Complaint   Patient presents with     Physical     Flu Shot       Blood pressure 112/69, pulse 80, temperature 97.4  F (36.3  C), temperature source Skin, resp. rate 15, height 1.882 m (6' 2.09\"), weight 72.6 kg (160 lb), SpO2 96 %. Body mass index is 20.49 kg/m .  Patient Active Problem List   Diagnosis     Keratoconus of both eyes       Wt Readings from Last 2 Encounters:   10/01/20 72.6 kg (160 lb)   02/01/19 78.5 kg (173 lb)     BP Readings from Last 3 Encounters:   10/01/20 112/69   02/01/19 115/69   01/16/19 100/62         Current Outpatient Medications   Medication     ibuprofen (ADVIL/MOTRIN) 200 MG tablet     No current facility-administered medications for this visit.        Social History     Tobacco Use     Smoking status: Never Smoker     Smokeless tobacco: Never Used   Substance Use Topics     Alcohol use: Yes     Frequency: 4 or more times a week     Drinks per session: 3 or 4     Binge frequency: Monthly     Drug use: Yes     Types: Marijuana       Health Maintenance Due   Topic Date Due     PREVENTIVE CARE VISIT  10/05/2019     PHQ-2  01/01/2020     INFLUENZA VACCINE (1) 09/01/2020       No results found for: PAP      October 1, 2020 2:34 PM    "

## 2020-10-01 NOTE — PROGRESS NOTES
3  SUBJECTIVE:   CC: Mike Cisneros is an 29 year old male who presents for preventive health visit.     Patient has been advised of split billing requirements and indicates understanding: Yes  Healthy Habits:    Do you get at least three servings of calcium containing foods daily (dairy, green leafy vegetables, etc.)? no, taking calcium and/or vitamin D supplement: no    Amount of exercise or daily activities, outside of work: No    Problems taking medications regularly No    Medication side effects: No    Have you had an eye exam in the past two years? yes    Do you see a dentist twice per year? yes    Do you have sleep apnea, excessive snoring or daytime drowsiness?no      PROBLEMS TO ADD ON...  Concern for weight loss  - high stress job  - no exercise recently  - E-cigarettes  - lost maybe 10 pounds  - sister had thyroid cancer, as did maternal aunt  - patient suspects weight loss is really due to long hours of stressful work and not eating or exercising    STD screening.   - denies any symptoms  - no concerning report from previous partners  - just wants to be safe as he has a new partner       Today's PHQ-2 Score:   PHQ-2 ( 1999 [employer]) 10/1/2020 2/1/2019   Q1: Little interest or pleasure in doing things 0 0   Q2: Feeling down, depressed or hopeless 0 0   PHQ-2 Score 0 0   Q1: Little interest or pleasure in doing things - -   Q2: Feeling down, depressed or hopeless - -   PHQ-2 Score - -       Abuse: Current or Past(Physical, Sexual or Emotional)- No  Do you feel safe in your environment? Yes        Social History     Tobacco Use     Smoking status: Never Smoker     Smokeless tobacco: Never Used   Substance Use Topics     Alcohol use: Yes     If you drink alcohol do you typically have >3 drinks per day or >7 drinks per week? No                      Last PSA: No results found for: PSA    Reviewed orders with patient. Reviewed health maintenance and updated orders accordingly - Yes      Reviewed and updated as  "needed this visit by clinical staff  Tobacco  Allergies  Meds   Med Hx  Surg Hx  Fam Hx  Soc Hx        Reviewed and updated as needed this visit by Provider   Allergies  Meds  Problems  Med Hx  Surg Hx          Past Medical History:   Diagnosis Date     Anxiety and depression       Past Surgical History:   Procedure Laterality Date     RHINOPLASTY Bilateral 2008       ROS:  CONSTITUTIONAL: Unintentional weight loss as noted above. NEGATIVE for fever, chills  INTEGUMENTARY/SKIN: NEGATIVE for worrisome rashes, moles or lesions  EYES: NEGATIVE for vision changes or irritation  ENT: NEGATIVE for ear, mouth and throat problems  RESP: NEGATIVE for significant cough or SOB  CV: NEGATIVE for chest pain, palpitations or peripheral edema  GI: NEGATIVE for nausea, abdominal pain, heartburn, or change in bowel habits   male: negative for dysuria, hematuria, decreased urinary stream, erectile dysfunction, urethral discharge  MUSCULOSKELETAL: NEGATIVE for significant arthralgias or myalgia  NEURO: NEGATIVE for weakness, dizziness or paresthesias  PSYCHIATRIC: NEGATIVE for changes in mood or affect    OBJECTIVE:   /69 (BP Location: Right arm, Patient Position: Sitting, Cuff Size: Adult Regular)   Pulse 80   Temp 97.4  F (36.3  C) (Skin)   Resp 15   Ht 1.882 m (6' 2.09\")   Wt 72.6 kg (160 lb)   SpO2 96%   BMI 20.49 kg/m      EXAM:  GENERAL: healthy, alert and no distress  EYES: Eyes grossly normal to inspection, PERRL and conjunctivae and sclerae normal  HENT: ear canals and TM's normal, nose and mouth without ulcers or lesions  NECK: no adenopathy, no asymmetry, masses, or scars and thyroid normal to palpation  RESP: lungs clear to auscultation - no rales, rhonchi or wheezes  CV: regular rate and rhythm, normal S1 S2, no S3 or S4, no murmur, click or rub, no peripheral edema and peripheral pulses strong  ABDOMEN: soft, nontender, no hepatosplenomegaly, no masses and bowel sounds normal  MS: no gross " "musculoskeletal defects noted, no edema  SKIN: no suspicious lesions or rashes  NEURO: Normal strength and tone, mentation intact and speech normal  PSYCH: mentation appears normal, affect normal/bright    Diagnostic Test Results:  Labs reviewed in Epic    ASSESSMENT/PLAN:   Mike was seen today for physical and flu shot.    Diagnoses and all orders for this visit:    Routine general medical examination at a health care facility    Screening for lipid disorders  -     Basic Metabolic Panel ([facility])  -     Lipid Panel ([facility])    Need for vaccination  -     C RIV4 (FLUBLOK) VACCINE RECOMBINANT DNA PRSRV ANTIBIO FREE, IM  -     ADMIN INFLUENZA VIRUS VACCINE    Screen for STD (sexually transmitted disease)  -     NEISSERIA GONORRHOEA PCR  -     CHLAMYDIA TRACHOMATIS PCR  -     Treponema Abs w Reflex to RPR and Titer  -     HIV Antigen Antibody Combo    Unintentional weight loss  -     TSH with free T4 reflex  -     CBC with Diff Plt ([facility])    Agree with patient that weight loss is most likely due to nicotine use, stress, lack of exercise in stressful work situation. Discussed options of counseling with [facility] and/or medication. Patient appears contemplative about action at this point. Per patient, \"I know what to do, I'm just not doing it.\" Will continue to discuss this with patient. In the meantime, I am adding a TSH and a CBC to basic labs, especially given report of family history of thyroid cancer.     Will notify patient of lab results as available.       Patient has been advised of split billing requirements and indicates understanding: Yes  COUNSELING:  Reviewed preventive health counseling, as reflected in patient instructions  Special attention given to:        Regular exercise       Healthy diet/nutrition       Vision screening       Hearing screening       Immunizations    Vaccinated for: Influenza             Safe sex practices/STD prevention       HIV screeninx in teen years, 1x in adult years, " and at intervals if high risk    Estimated body mass index is 23.46 kg/m  as calculated from the following:    Height as of 1/16/19: 1.829 m (6').    Weight as of 2/1/19: 78.5 kg (173 lb).      He reports that he has never smoked. He has never used smokeless tobacco.      Counseling Resources:  ATP IV Guidelines  Pooled Cohorts Equation Calculator  FRAX Risk Assessment  ICSI Preventive Guidelines  Dietary Guidelines for Americans, 2010  USDA's MyPlate  ASA Prophylaxis  Lung CA Screening    Memo Vargas MD  [facility]

## 2020-10-02 LAB
C TRACH DNA SPEC QL NAA+PROBE: NEGATIVE
HIV 1+2 AB+HIV1 P24 AG SERPL QL IA: NONREACTIVE
N GONORRHOEA DNA SPEC QL NAA+PROBE: NEGATIVE
SPECIMEN SOURCE: NORMAL
SPECIMEN SOURCE: NORMAL
T PALLIDUM AB SER QL: NONREACTIVE

## 2021-02-25 ENCOUNTER — OFFICE VISIT (OUTPATIENT)
Dept: ORTHOPEDICS | Facility: CLINIC | Age: 31
End: 2021-02-25
Payer: COMMERCIAL

## 2021-02-25 ENCOUNTER — PRE VISIT (OUTPATIENT)
Dept: ORTHOPEDICS | Facility: CLINIC | Age: 31
End: 2021-02-25

## 2021-02-25 ENCOUNTER — ANCILLARY PROCEDURE (OUTPATIENT)
Dept: GENERAL RADIOLOGY | Facility: CLINIC | Age: 31
End: 2021-02-25
Payer: COMMERCIAL

## 2021-02-25 VITALS — WEIGHT: 155 LBS | HEIGHT: 74 IN | BODY MASS INDEX: 19.89 KG/M2

## 2021-02-25 DIAGNOSIS — M54.50 LOW BACK PAIN, UNSPECIFIED BACK PAIN LATERALITY, UNSPECIFIED CHRONICITY, UNSPECIFIED WHETHER SCIATICA PRESENT: Primary | ICD-10-CM

## 2021-02-25 DIAGNOSIS — M54.50 LOW BACK PAIN: ICD-10-CM

## 2021-02-25 PROCEDURE — 99204 OFFICE O/P NEW MOD 45 MIN: CPT | Performed by: PREVENTIVE MEDICINE

## 2021-02-25 PROCEDURE — 72100 X-RAY EXAM L-S SPINE 2/3 VWS: CPT | Performed by: RADIOLOGY

## 2021-02-25 RX ORDER — MELOXICAM 15 MG/1
15 TABLET ORAL DAILY
Qty: 30 TABLET | Refills: 0 | Status: SHIPPED | OUTPATIENT
Start: 2021-02-25 | End: 2021-03-22

## 2021-02-25 ASSESSMENT — MIFFLIN-ST. JEOR: SCORE: 1732.83

## 2021-02-25 NOTE — PROGRESS NOTES
HISTORY OF PRESENT ILLNESS  Mr. Cisneros is a pleasant 30 year old year old male who presents with low back pain   presents to clinic today with on/off low back pain  Worse over past month  Location: low back  Quality:  achy pain    Severity: 5/10 at worst    Duration: past month or so  occurs intermittently  Context: occurs while exercising and lifting  Modifying factors:  resting and non-use makes it better, movement and use makes it worse  Associated signs & symptoms: no radiation of pain into legs regularly    MEDICAL HISTORY  Patient Active Problem List   Diagnosis     Keratoconus of both eyes       Current Outpatient Medications   Medication Sig Dispense Refill     ibuprofen (ADVIL/MOTRIN) 200 MG tablet Take 600 mg by mouth every 6 hours as needed for mild pain         No Known Allergies    Family History   Problem Relation Age of Onset     Lung Cancer Maternal Grandmother      Lung Cancer Paternal Grandmother      Thyroid Cancer Sister      Hyperlipidemia Mother      Hyperlipidemia Father      Thyroid Cancer Paternal Aunt      Diabetes No family hx of      Social History     Socioeconomic History     Marital status: Single     Spouse name: None     Number of children: None     Years of education: None     Highest education level: None   Occupational History     None   Social Needs     Financial resource strain: None     Food insecurity     Worry: None     Inability: None     Transportation needs     Medical: None     Non-medical: None   Tobacco Use     Smoking status: Never Smoker     Smokeless tobacco: Never Used   Substance and Sexual Activity     Alcohol use: Yes     Frequency: 4 or more times a week     Drinks per session: 3 or 4     Binge frequency: Monthly     Drug use: Yes     Types: Marijuana     Sexual activity: Yes     Partners: Female     Birth control/protection: Condom   Lifestyle     Physical activity     Days per week: None     Minutes per session: None     Stress: None   Relationships     Social  "connections     Talks on phone: None     Gets together: None     Attends Sabianism service: None     Active member of club or organization: None     Attends meetings of clubs or organizations: None     Relationship status: None     Intimate partner violence     Fear of current or ex partner: None     Emotionally abused: None     Physically abused: None     Forced sexual activity: None   Other Topics Concern     Parent/sibling w/ CABG, MI or angioplasty before 65F 55M? Not Asked   Social History Narrative     None       Additional medical/Social/Surgical histories reviewed in [facility] and updated as appropriate.     REVIEW OF SYSTEMS (2/25/2021)  10 point ROS of systems including Constitutional, Eyes, Respiratory, Cardiovascular, Gastroenterology, Genitourinary, Integumentary, Musculoskeletal, Psychiatric, Allergic/Immunologic were all negative except for pertinent positives noted in my HPI.     PHYSICAL EXAM  Vitals:    02/25/21 1707   Weight: 70.3 kg (155 lb)   Height: 1.88 m (6' 2\")     Vital Signs: Ht 1.88 m (6' 2\")   Wt 70.3 kg (155 lb)   BMI 19.90 kg/m   Patient declined being weighed. Body mass index is 19.9 kg/m .    General  - normal appearance, in no obvious distress  HEENT  - conjunctivae not injected, moist mucous membranes, normocephalic/atraumatic head, ears normal appearance, no lesions, mouth normal appearance, no scars, normal dentition and teeth present  CV  - normal peripheral perfusion  Pulm  - normal respiratory pattern, non-labored  Musculoskeletal - lumbar spine  - stance: normal gait without limp, no obvious leg length discrepancy, normal heel and toe walk  - inspection: normal bone and joint alignment, no obvious scoliosis  - palpation: no paravertebral or bony tenderness  - ROM: flexion exacerbates some pain, normal extension with some pain, sidebending, rotation  - strength: lower extremities 5/5 in all planes  - special tests:  (+) straight leg raise- some low back pain  (+) slump test - " some low back pain  Neuro  - patellar and Achilles DTRs 2+ bilaterally, no lower extremity sensory deficit throughout L5 distribution, grossly normal coordination, normal muscle tone  Skin  - no ecchymosis, erythema, warmth, or induration, no obvious rash  Psych  - interactive, appropriate, normal mood and affect    ASSESSMENT & PLAN  29 yo male with lumbar discogenic pain, possible disc herniation, chronic pars defect  Reviewed lumbar xrays: shows some disc space narrowing, Questionable pars interarticularis defect at the L5  particularly on the left though not fully assessed on the current  study due to projection. Oblique views or CT may be helpful if  clinically indicated.  Consider lumbar CT vs. Lumbar MRI  Start mobic and tizanadine  Start HEP  Appropriate PPE was utilized for prevention of spread of Covid-19.  Jackson Munguia MD, CAQSM

## 2021-02-25 NOTE — LETTER
2/25/2021         RE: Mike Cisneros  [address]  [facility] [address]        Dear Colleague,    Thank you for referring your patient, Mike Cisneros, to the [facility] [place]. Please see a copy of my visit note below.          SPORTS & ORTHOPEDIC WALK-IN VISIT 2/25/2021    Primary Care Physician: Memo Doe.    ~2/15/21 - had an instance of low back pain after standing up poorly. Does not recall exact JULIETA, but remembers that this was the illiciting event.    Has tried some ibuprofen, and heat belt. This had helped resolve the initial issue.  Recently on Tuesday morning, he coughed and flared up his Sx significantly.  This instance made his Sx worse than they were the first time.    Pain is L sided, denies radicular Sx.         HISTORY OF PRESENT ILLNESS  Mr. Cisneros is a pleasant 30 year old year old male who presents with low back pain   presents to clinic today with on/off low back pain  Worse over past month  Location: low back  Quality:  achy pain    Severity: 5/10 at worst    Duration: past month or so  occurs intermittently  Context: occurs while exercising and lifting  Modifying factors:  resting and non-use makes it better, movement and use makes it worse  Associated signs & symptoms: no radiation of pain into legs regularly    MEDICAL HISTORY  Patient Active Problem List   Diagnosis     Keratoconus of both eyes       Current Outpatient Medications   Medication Sig Dispense Refill     ibuprofen (ADVIL/MOTRIN) 200 MG tablet Take 600 mg by mouth every 6 hours as needed for mild pain         No Known Allergies    Family History   Problem Relation Age of Onset     Lung Cancer Maternal Grandmother      Lung Cancer Paternal Grandmother      Thyroid Cancer Sister      Hyperlipidemia Mother      Hyperlipidemia Father      Thyroid Cancer Paternal Aunt      Diabetes No family hx of      Social History     Socioeconomic History     Marital status: Single      "Spouse name: None     Number of children: None     Years of education: None     Highest education level: None   Occupational History     None   Social Needs     Financial resource strain: None     Food insecurity     Worry: None     Inability: None     Transportation needs     Medical: None     Non-medical: None   Tobacco Use     Smoking status: Never Smoker     Smokeless tobacco: Never Used   Substance and Sexual Activity     Alcohol use: Yes     Frequency: 4 or more times a week     Drinks per session: 3 or 4     Binge frequency: Monthly     Drug use: Yes     Types: Marijuana     Sexual activity: Yes     Partners: Female     Birth control/protection: Condom   Lifestyle     Physical activity     Days per week: None     Minutes per session: None     Stress: None   Relationships     Social connections     Talks on phone: None     Gets together: None     Attends [employer] service: None     Active member of club or organization: None     Attends meetings of clubs or organizations: None     Relationship status: None     Intimate partner violence     Fear of current or ex partner: None     Emotionally abused: None     Physically abused: None     Forced sexual activity: None   Other Topics Concern     Parent/sibling w/ CABG, MI or angioplasty before 65F 55M? Not Asked   Social History Narrative     None       Additional medical/Social/Surgical histories reviewed in [facility] and updated as appropriate.     REVIEW OF SYSTEMS (2/25/2021)  10 point ROS of systems including Constitutional, Eyes, Respiratory, Cardiovascular, Gastroenterology, Genitourinary, Integumentary, Musculoskeletal, Psychiatric, Allergic/Immunologic were all negative except for pertinent positives noted in my HPI.     PHYSICAL EXAM  Vitals:    02/25/21 1707   Weight: 70.3 kg (155 lb)   Height: 1.88 m (6' 2\")     Vital Signs: Ht 1.88 m (6' 2\")   Wt 70.3 kg (155 lb)   BMI 19.90 kg/m   Patient declined being weighed. Body mass index is 19.9 " kg/m .    General  - normal appearance, in no obvious distress  HEENT  - conjunctivae not injected, moist mucous membranes, normocephalic/atraumatic head, ears normal appearance, no lesions, mouth normal appearance, no scars, normal dentition and teeth present  CV  - normal peripheral perfusion  Pulm  - normal respiratory pattern, non-labored  Musculoskeletal - lumbar spine  - stance: normal gait without limp, no obvious leg length discrepancy, normal heel and toe walk  - inspection: normal bone and joint alignment, no obvious scoliosis  - palpation: no paravertebral or bony tenderness  - ROM: flexion exacerbates some pain, normal extension with some pain, sidebending, rotation  - strength: lower extremities 5/5 in all planes  - special tests:  (+) straight leg raise- some low back pain  (+) slump test - some low back pain  Neuro  - patellar and Achilles DTRs 2+ bilaterally, no lower extremity sensory deficit throughout L5 distribution, grossly normal coordination, normal muscle tone  Skin  - no ecchymosis, erythema, warmth, or induration, no obvious rash  Psych  - interactive, appropriate, normal mood and affect    ASSESSMENT & PLAN  29 yo male with lumbar discogenic pain, possible disc herniation, chronic pars defect  Reviewed lumbar xrays: shows some disc space narrowing, Questionable pars interarticularis defect at the L5  particularly on the left though not fully assessed on the current  study due to projection. Oblique views or CT may be helpful if  clinically indicated.  Consider lumbar CT vs. Lumbar MRI  Start mobic and tizanadine  Start HEP  Appropriate PPE was utilized for prevention of spread of Covid-19.  Jackson Munguia MD, [facility]

## 2021-02-25 NOTE — PROGRESS NOTES
SPORTS & ORTHOPEDIC WALK-IN VISIT 2/25/2021    Primary Care Physician: Memo Doe.    ~2/15/21 - had an instance of low back pain after standing up poorly. Does not recall exact JULIETA, but remembers that this was the illiciting event.    Has tried some ibuprofen, and heat belt. This had helped resolve the initial issue.  Recently on Tuesday morning, he coughed and flared up his Sx significantly.  This instance made his Sx worse than they were the first time.    Pain is L sided, denies radicular Sx.

## 2021-02-25 NOTE — TELEPHONE ENCOUNTER
DIAGNOSIS: LBP / No Imaging / Self.Refer   APPOINTMENT DATE: 2.25.21   NOTES STATUS DETAILS   OFFICE NOTE from referring provider N/A    OFFICE NOTE from other specialist N/A    DISCHARGE SUMMARY from hospital N/A    DISCHARGE REPORT from the ER N/A    OPERATIVE REPORT N/A    EMG report N/A    MEDICATION LIST Internal    MRI N/A    DEXA (osteoporosis/bone health) N/A    CT SCAN N/A    XRAYS (IMAGES & REPORTS) N/A

## 2021-03-11 ENCOUNTER — VIRTUAL VISIT (OUTPATIENT)
Dept: ORTHOPEDICS | Facility: CLINIC | Age: 31
End: 2021-03-11
Payer: COMMERCIAL

## 2021-03-11 DIAGNOSIS — M54.50 ACUTE BILATERAL LOW BACK PAIN WITHOUT SCIATICA: Primary | ICD-10-CM

## 2021-03-11 PROCEDURE — 99212 OFFICE O/P EST SF 10 MIN: CPT | Mod: GT | Performed by: FAMILY MEDICINE

## 2021-03-11 NOTE — PROGRESS NOTES
SPORTS & ORTHOPEDIC WALK-IN FOLLOW-UP VISIT 3/11/2021    Much improved with better ergonomics and some stretching.    Interval History:     Follow up reason: 2 wk f/u    Date of injury/onset: 2/15/21    Date last seen: 2/25/21       Mike is a 30 year old who is being evaluated via a billable video visit.      How would you like to obtain your AVS? MyChart  If the video visit is dropped, the invitation should be resent by: Send to e-mail at: [email or web address]  Will anyone else be joining your video visit? No      ESTABLISHED PATIENT FOLLOW-UP VIRTUAL:  Follow Up (2 wk lbp f/u)     This encounter was conducted via telephone in lieu of face-to-face encounter due to precautions implemented during COVID-19 pandemic.     HISTORY OF PRESENT ILLNESS  Mr. Cisneros is a pleasant 30 year old year old male who presents via telephone today for follow-up of low back pain.      Follow up reason: 2 wk f/u    Date of injury/onset: 2/15/21    Date last seen: 2/25/21    Pain: Improved    Function: Improved    Interval Hx: Mike notes marked improvement of his aching low back pain.  Has improved his ergonomics working from home.  Also completed two days of muscle relaxant which was helpful. Queries whether he could consider medical massage. No other concerns at this time    Additional medical/Social/Surgical histories reviewed in [facility] and updated as appropriate.    REVIEW OF SYSTEMS (3/11/2021)  CONSTITUTIONAL: Denies fever and weight loss  GASTROINTESTINAL: Denies abdominal pain, nausea, vomiting  MUSCULOSKELETAL: See HPI  SKIN: Denies any recent rash or lesion  NEUROLOGICAL: Denies numbness or focal weakness     ASSESSMENT & PLAN  Mr. Cisneros is a 30 year old year old male who presents via telephone today for f/u regarding low back pain without radiculopathy. Marked improvement.    -Continue LBP exercises  -Medical massage, will complete letter of medical necessity  -Ergonomics and consider lumbar pillow  -Follow up  PRN    It was a pleasure seeing Mike.    Jackson Graham DO, CAQSM  Primary Care Sports Medicine  Department of Orthopedic Surgery  [facility]    Video Start Time:1658  Video-Visit Details    Type of service:  Video Visit    Video End Time:1710    Originating Location (pt. Location): Home    Distant Location (provider location):  [facility] [place]     Platform used for Video Visit: [employer]

## 2021-03-11 NOTE — LETTER
3/11/2021       RE: Mike Cisneros  [address]  [facility] [address]    Dear Colleague,    Thank you for referring your patient, Mike Cisneros, to the [facility]OP[facility]ALK[facility]IN[facility] [place]. Please see a copy of my visit note below.          SPORTS & ORTHOPEDIC WALK-IN FOLLOW-UP VISIT 3/11/2021    Much improved with better ergonomics and some stretching.    Interval History:     Follow up reason: 2 wk f/u    Date of injury/onset: 2/15/21    Date last seen: 2/25/21       Mike is a 30 year old who is being evaluated via a billable video visit.      How would you like to obtain your AVS? MyChart  If the video visit is dropped, the invitation should be resent by: Send to e-mail at: [email or web address]  Will anyone else be joining your video visit? No      ESTABLISHED PATIENT FOLLOW-UP VIRTUAL:  Follow Up (2 wk lbp f/u)     This encounter was conducted via telephone in lieu of face-to-face encounter due to precautions implemented during COVID-19 pandemic.     HISTORY OF PRESENT ILLNESS  Mr. Cisneros is a pleasant 30 year old year old male who presents via telephone today for follow-up of low back pain.      Follow up reason: 2 wk f/u    Date of injury/onset: 2/15/21    Date last seen: 2/25/21    Pain: Improved    Function: Improved    Interval Hx: Mike notes marked improvement of his aching low back pain.  Has improved his ergonomics working from home.  Also completed two days of muscle relaxant which was helpful. Queries whether he could consider medical massage. No other concerns at this time    Additional medical/Social/Surgical histories reviewed in [facility] and updated as appropriate.    REVIEW OF SYSTEMS (3/11/2021)  CONSTITUTIONAL: Denies fever and weight loss  GASTROINTESTINAL: Denies abdominal pain, nausea, vomiting  MUSCULOSKELETAL: See HPI  SKIN: Denies any recent rash or lesion  NEUROLOGICAL: Denies numbness or focal weakness     ASSESSMENT & PLAN  Mr. Cisneros is a 30 year old  year old male who presents via telephone today for f/u regarding low back pain without radiculopathy. Marked improvement.    -Continue LBP exercises  -Medical massage, will complete letter of medical necessity  -Ergonomics and consider lumbar pillow  -Follow up PRN    It was a pleasure seeing Marzena Graham DO, CAQSM  Primary Care Sports Medicine  Department of Orthopedic Surgery  [facility]    Video Start Time:1658  Video-Visit Details    Type of service:  Video Visit    Video End Time:1710    Originating Location (pt. Location): Home    Distant Location (provider location):  [facility] [place]     Platform used for Video Visit: [employer]

## 2021-03-20 DIAGNOSIS — M54.50 LOW BACK PAIN, UNSPECIFIED BACK PAIN LATERALITY, UNSPECIFIED CHRONICITY, UNSPECIFIED WHETHER SCIATICA PRESENT: ICD-10-CM

## 2021-03-22 RX ORDER — MELOXICAM 15 MG/1
TABLET ORAL
Qty: 30 TABLET | Refills: 0 | Status: SHIPPED | OUTPATIENT
Start: 2021-03-22 | End: 2022-04-14

## 2021-09-25 ENCOUNTER — HEALTH MAINTENANCE LETTER (OUTPATIENT)
Age: 31
End: 2021-09-25

## 2021-11-20 ENCOUNTER — HEALTH MAINTENANCE LETTER (OUTPATIENT)
Age: 31
End: 2021-11-20
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
## 2022-04-14 ENCOUNTER — OFFICE VISIT (OUTPATIENT)
Dept: FAMILY MEDICINE | Facility: CLINIC | Age: 32
End: 2022-04-14
Payer: COMMERCIAL

## 2022-04-14 ENCOUNTER — OFFICE VISIT (OUTPATIENT)
Dept: BEHAVIORAL HEALTH | Facility: CLINIC | Age: 32
End: 2022-04-14

## 2022-04-14 VITALS
WEIGHT: 160 LBS | SYSTOLIC BLOOD PRESSURE: 112 MMHG | OXYGEN SATURATION: 97 % | BODY MASS INDEX: 21.2 KG/M2 | DIASTOLIC BLOOD PRESSURE: 78 MMHG | HEART RATE: 85 BPM | RESPIRATION RATE: 15 BRPM | TEMPERATURE: 97 F | HEIGHT: 73 IN

## 2022-04-14 DIAGNOSIS — Z23 NEED FOR DIPHTHERIA-TETANUS-PERTUSSIS (TDAP) VACCINE: ICD-10-CM

## 2022-04-14 DIAGNOSIS — Z00.00 ROUTINE GENERAL MEDICAL EXAMINATION AT A HEALTH CARE FACILITY: Primary | ICD-10-CM

## 2022-04-14 DIAGNOSIS — F43.20 ADJUSTMENT DISORDER, UNSPECIFIED TYPE: Primary | ICD-10-CM

## 2022-04-14 DIAGNOSIS — Z13.1 SCREENING FOR DIABETES MELLITUS: ICD-10-CM

## 2022-04-14 DIAGNOSIS — Z13.220 LIPID SCREENING: ICD-10-CM

## 2022-04-14 DIAGNOSIS — F41.9 ANXIETY: ICD-10-CM

## 2022-04-14 LAB
ANION GAP SERPL CALCULATED.3IONS-SCNC: 6 MMOL/L (ref 3–14)
BUN SERPL-MCNC: 20 MG/DL (ref 7–30)
CALCIUM SERPL-MCNC: 9.7 MG/DL (ref 8.5–10.1)
CHLORIDE BLD-SCNC: 107 MMOL/L (ref 94–109)
CHOLEST SERPL-MCNC: 199 MG/DL
CO2 SERPL-SCNC: 30 MMOL/L (ref 20–32)
CREAT SERPL-MCNC: 0.98 MG/DL (ref 0.66–1.25)
FASTING STATUS PATIENT QL REPORTED: NO
GFR SERPL CREATININE-BSD FRML MDRD: >90 ML/MIN/1.73M2
GLUCOSE BLD-MCNC: 95 MG/DL (ref 70–99)
HDLC SERPL-MCNC: 54 MG/DL
LDLC SERPL CALC-MCNC: 135 MG/DL
NONHDLC SERPL-MCNC: 145 MG/DL
POTASSIUM BLD-SCNC: 4.1 MMOL/L (ref 3.4–5.3)
SODIUM SERPL-SCNC: 143 MMOL/L (ref 133–144)
TRIGL SERPL-MCNC: 48 MG/DL

## 2022-04-14 PROCEDURE — 80061 LIPID PANEL: CPT | Performed by: FAMILY MEDICINE

## 2022-04-14 PROCEDURE — 80048 BASIC METABOLIC PNL TOTAL CA: CPT | Performed by: FAMILY MEDICINE

## 2022-04-14 ASSESSMENT — PATIENT HEALTH QUESTIONNAIRE - PHQ9: SUM OF ALL RESPONSES TO PHQ QUESTIONS 1-9: 12

## 2022-04-14 NOTE — PROGRESS NOTES
Patient had appointment with her primary care physician. [facility] services were offered. No immediate safety/risk issues were reported or identified.  Explained the role of the [facility] and provided contact information for the [facility].  Pt educated how and encouraged pt to obtain more information about mental health benefits.     Pt reported previous therapy experience and taken medication in the past. Pt was open to meeting with [facility] in order to start taking action to more effectively address mental health, and stated he would schedule [facility] appt.        Shawn G. Ullrich, [facility], Behavioral Health Clinician

## 2022-04-14 NOTE — NURSING NOTE
"31 year old  Chief Complaint   Patient presents with     Physical     With labs, no other concerns       Blood pressure 112/78, pulse 85, temperature 97  F (36.1  C), temperature source Skin, resp. rate 15, height 1.86 m (6' 1.23\"), weight 72.6 kg (160 lb), SpO2 97 %. Body mass index is 20.98 kg/m .  Patient Active Problem List   Diagnosis     Keratoconus of both eyes       Wt Readings from Last 2 Encounters:   04/14/22 72.6 kg (160 lb)   02/25/21 70.3 kg (155 lb)     BP Readings from Last 3 Encounters:   04/14/22 112/78   10/01/20 112/69   02/01/19 115/69         Current Outpatient Medications   Medication     ibuprofen (ADVIL/MOTRIN) 200 MG tablet     meloxicam (MOBIC) 15 MG tablet     tiZANidine (ZANAFLEX) 4 MG tablet     No current facility-administered medications for this visit.       Social History     Tobacco Use     Smoking status: Never Smoker     Smokeless tobacco: Never Used   Vaping Use     Vaping Use: Every day     Substances: Nicotine   Substance Use Topics     Alcohol use: Yes     Comment: 2-3 drinks per week     Drug use: Yes     Types: Marijuana       Health Maintenance Due   Topic Date Due     ADVANCE CARE PLANNING  Never done     HEPATITIS C SCREENING  Never done     INFLUENZA VACCINE (1) 09/01/2021     DTAP/TDAP/TD IMMUNIZATION (7 - Td or Tdap) 04/03/2022       No results found for: PAP      April 14, 2022 2:54 PM    "

## 2022-04-26 ASSESSMENT — PATIENT HEALTH QUESTIONNAIRE - PHQ9
SUM OF ALL RESPONSES TO PHQ QUESTIONS 1-9: 13
10. IF YOU CHECKED OFF ANY PROBLEMS, HOW DIFFICULT HAVE THESE PROBLEMS MADE IT FOR YOU TO DO YOUR WORK, TAKE CARE OF THINGS AT HOME, OR GET ALONG WITH OTHER PEOPLE: SOMEWHAT DIFFICULT
SUM OF ALL RESPONSES TO PHQ QUESTIONS 1-9: 13

## 2022-04-26 ASSESSMENT — ANXIETY QUESTIONNAIRES
7. FEELING AFRAID AS IF SOMETHING AWFUL MIGHT HAPPEN: SEVERAL DAYS
7. FEELING AFRAID AS IF SOMETHING AWFUL MIGHT HAPPEN: SEVERAL DAYS
GAD7 TOTAL SCORE: 12
5. BEING SO RESTLESS THAT IT IS HARD TO SIT STILL: NOT AT ALL
6. BECOMING EASILY ANNOYED OR IRRITABLE: SEVERAL DAYS
4. TROUBLE RELAXING: SEVERAL DAYS
GAD7 TOTAL SCORE: 12
1. FEELING NERVOUS, ANXIOUS, OR ON EDGE: NEARLY EVERY DAY
2. NOT BEING ABLE TO STOP OR CONTROL WORRYING: NEARLY EVERY DAY
GAD7 TOTAL SCORE: 12
3. WORRYING TOO MUCH ABOUT DIFFERENT THINGS: NEARLY EVERY DAY
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
## 2022-04-27 ENCOUNTER — OFFICE VISIT (OUTPATIENT)
Dept: BEHAVIORAL HEALTH | Facility: CLINIC | Age: 32
End: 2022-04-27
Payer: COMMERCIAL

## 2022-04-27 DIAGNOSIS — F12.20 MODERATE CANNABIS USE DISORDER (H): ICD-10-CM

## 2022-04-27 DIAGNOSIS — F41.9 ANXIETY DISORDER, UNSPECIFIED TYPE: ICD-10-CM

## 2022-04-27 DIAGNOSIS — F33.1 MAJOR DEPRESSIVE DISORDER, RECURRENT EPISODE, MODERATE (H): Primary | ICD-10-CM

## 2022-04-27 ASSESSMENT — COLUMBIA-SUICIDE SEVERITY RATING SCALE - C-SSRS
2. HAVE YOU ACTUALLY HAD ANY THOUGHTS OF KILLING YOURSELF?: NO
3. HAVE YOU BEEN THINKING ABOUT HOW YOU MIGHT KILL YOURSELF?: NO
6. HAVE YOU EVER DONE ANYTHING, STARTED TO DO ANYTHING, OR PREPARED TO DO ANYTHING TO END YOUR LIFE?: NO
4. HAVE YOU HAD THESE THOUGHTS AND HAD SOME INTENTION OF ACTING ON THEM?: NO
5. HAVE YOU STARTED TO WORK OUT OR WORKED OUT THE DETAILS OF HOW TO KILL YOURSELF? DO YOU INTEND TO CARRY OUT THIS PLAN?: NO
ATTEMPT LIFETIME: NO
1. IN THE PAST MONTH, HAVE YOU WISHED YOU WERE DEAD OR WISHED YOU COULD GO TO SLEEP AND NOT WAKE UP?: YES
REASONS FOR IDEATION PAST MONTH: DOES NOT APPLY
2. HAVE YOU ACTUALLY HAD ANY THOUGHTS OF KILLING YOURSELF?: YES
TOTAL  NUMBER OF INTERRUPTED ATTEMPTS LIFETIME: NO
TOTAL  NUMBER OF ABORTED OR SELF INTERRUPTED ATTEMPTS LIFETIME: NO
1. HAVE YOU WISHED YOU WERE DEAD OR WISHED YOU COULD GO TO SLEEP AND NOT WAKE UP?: YES
REASONS FOR IDEATION LIFETIME: COMPLETELY TO END OR STOP THE PAIN (YOU COULDN'T GO ON LIVING WITH THE PAIN OR HOW YOU WERE FEELING)

## 2022-04-27 ASSESSMENT — PATIENT HEALTH QUESTIONNAIRE - PHQ9: SUM OF ALL RESPONSES TO PHQ QUESTIONS 1-9: 13

## 2022-04-27 ASSESSMENT — ANXIETY QUESTIONNAIRES: GAD7 TOTAL SCORE: 12

## 2022-05-05 NOTE — PROGRESS NOTES
[facility]  May 6, 2022    Behavioral Health Clinician Progress Note    Patient Name: Mike Cisneros           Service Type:  Individual      Service Location:   Face to Face in Clinic     Session Start Time: 12:31 pm  Session End Time: 1:01 pm      Session Length: 16 - 37      Attendees: Patient     Service Modality:  In-person    Visit Activities (Refresh list every visit): [facility] Only    Diagnostic Assessment Date: 4/27/22  Treatment Plan Review Date: 8/5/22  CGI Review Date: 7/27/22  Promis 10 Review Date: 7/27/22    See Flowsheets for today's PHQ-9 and FELICIA-7 results  Previous PHQ-9:   PHQ-9 SCORE 10/5/2018 4/14/2022 4/26/2022   PHQ-9 Total Score MyChart - - 13 (Moderate depression)   PHQ-9 Total Score 4 12 13     Previous FELICIA-7:   FELICIA-7 SCORE 4/26/2022   Total Score 12 (moderate anxiety)   Total Score 12       RIVERA LEVEL:  No flowsheet data found.    DATA  Extended Session (60+ minutes): No  Interactive Complexity: No  Crisis: No  [facility] Patient: No    Treatment Objective(s) Addressed in This Session:  Target Behavior(s): healthy lifestyle habits    Depressed Mood: Increase interest, engagement, and pleasure in doing things  Decrease frequency and intensity of feeling down, depressed, hopeless  Feel less tired and more energy during the day   Improve diet, appetite, mindful eating, and / or meal planning  Improve concentration, focus, and mindfulness in daily activities   Feel less fidgety, restless or slow in daily activities / interpersonal interactions  Anxiety: will experience a reduction in anxiety, will develop more effective coping skills to manage anxiety symptoms, will develop healthy cognitive patterns and beliefs and will increase ability to function adaptively    Current Stressors / Issues:  [facility] used MI approach to engage Mike, identify goals, and decrease ambivalence.  Mike reported thinking about last week's appt and while not much has changed in his day to day life, he's ready to  "take steps in order to live healthier life, specifically stating he's still open to referral to long-term individual therapy and he's also contemplating medication option as well.  Finally, Mike clearly identified struggling between making decisions for the short term vs the long-term.  Mike reported his unhealthy lifestyle choices are serving a short term goal/need, but will have consequences on long-term goals of relationships, health, and meaningful/purposeful activity (\"doing something I'm truly proud of\").   Mike expressed change talk, stating he has control over his life and these are his decisions.        Finally, [facility] and Mike identified necessary criteria for individual therapy:    In-network  Modality: Therapist practices Acceptance and Commitment Therapy (ACT)   Gender: no preference  In Person vs Vidoe: strongly prefers in-person; but will consider video  Location: [facility] will submit referral to therapy services; Mike affirmed ability to schedule with [facility] in the future if needed    Mike acknowledged recent passive SI, denied intent or plan to die or harm himself.  Mike was future oriented, has protective factors, and denied current safety concerns.           Progress on Treatment Objective(s) / Homework:  Minimal progress - CONTEMPLATION (Considering change and yet undecided); Intervened by assessing the negative and positive thinking (ambivalence) about behavior change     Motivational Interviewing    MI Intervention: Co-Developed Goal: make and follow through with healthy lifestyle choices, Expressed Empathy/Understanding, Supported Autonomy, Collaboration, Evocation, Open-ended questions, Reflections: simple and complex and Change talk (evoked)     Change Talk Expressed by the Patient: Desire to change Ability to change Reasons to change Need to change    Provider Response to Change Talk: E - Evoked more info from patient about behavior change, A - Affirmed patient's thoughts, " "decisions, or attempts at behavior change, R - Reflected patient's change talk and S - Summarized patient's change talk statements    Psycho-education regarding mental health diagnoses and treatment options    Care Plan review completed: Yes    Medication Review:  No current psychiatric medications prescribed    Medication Compliance:  NA    Changes in Health Issues:   None reported    Chemical Use Review:   Substance Use: Problem use continues with no change since last session, Stage of Change: Contemplation        Tobacco Use: currently vaping    Assessment: Current Emotional / Mental Status (status of significant symptoms):  Risk status (Self / Other harm or suicidal ideation)  Patient has had a history of suicidal ideation: SI; passive  Patient denies current fears or concerns for personal safety.  Patient reports the following current or recent suicidal ideation or behaviors: passive, no intent or plan, see below.  Patient denies current or recent homicidal ideation or behaviors.  Patient denies current or recent self injurious behavior or ideation.  Patient denies other safety concerns.  A safety and risk management plan has not been developed at this time, however patient was encouraged to call [place] [phone] should there be a change in any of these risk factors.    Pt declined to engage in safety planning, denying any current safety concerns and stated \"its (suicide/self-harm) off the table\".  [facility] attempted to offer and educate pt about available Crisis Resources and again, pt declined.      Appearance:   Appropriate   Eye Contact:   Good   Psychomotor Behavior: Normal    Attitude:   Cooperative   Orientation:   All  Speech   Rate / Production: Normal/ Responsive   Volume:  Normal   Mood:    Anxious  Depressed   Affect:    Congruent with mood   Thought Content:  Clear   Thought Form:  Coherent  Logical   Insight:    Good     Diagnoses:  1. Major depressive disorder, recurrent episode, moderate (H)    2. " Anxiety disorder, unspecified type    3. Moderate cannabis use disorder (H)      F33.1 Major Depressive Disorder, Recurrent Episode, Moderate  F41.9 Unspecified Anxiety Disorder  F12.20 Cannabis Use Disorder Moderate    Collateral Reports Completed:  Routed note to PCP    Plan: (Homework, other):  Patient was given information about behavioral services and encouraged to schedule a follow up appointment with the clinic [facility] as needed.  He was also given information about mental health symptoms and treatment options .  CD Recommendations: [facility] recommended pt abstain from, or at least reduce, alcohol and cannabis use at this time, educated pt about [facility] treatment programming and offered to make referral, but pt declined all LINA services and/or supports at this time.  , .Shawn Ullrich [facility], [facility]      ______________________________________________________________________    Integrated Primary Care Behavioral Health Treatment Plan    Patient's Name: Mike Cisneros  YOB: 1990    Date of Creation: 5/6/22  Date Treatment Plan Last Reviewed/Revised: 5/6/22    DSM5 Diagnoses:   F33.1 Major Depressive Disorder, Recurrent Episode, Moderate  F41.9 Unspecified Anxiety Disorder  F12.20 Cannabis Use Disorder Moderate  Psychosocial / Contextual Factors: Male, heterosexual, single; symptoms of anxiety and depression, referred by PCP, unhealthy work-life balance--significant work stress  PROMIS (reviewed every 90 days):  Pt completed on 4/26/22    Referral / Collaboration:  The following referral(s) will be initiated: Individual talk therapy services.    [facility]  Medication evaluation: start with PCP.  IOP: [facility] educated pt about and recommeneded  IOP MH tx program, pt declined  -[facility] will submit referral to long-term individual therapy after identifying therapy criteria during next appt    Anticipated number of session for this episode of care: 4  Anticipation frequency of session: Every other  week  Anticipated Duration of each session: 16-37 minutes  Treatment plan will be reviewed in 90 days or when goals have been changed.       MeasurableTreatment Goal(s) related to diagnosis / functional impairment(s)  Goal 1: Patient will make and follow through with healthy lifestyle decisions.       Objective #A (Patient Action)    Patient will Increase interest, engagement, and pleasure in doing things  Decrease frequency and intensity of feeling down, depressed, hopeless  Feel less tired and more energy during the day   Improve diet, appetite, mindful eating, and / or meal planning  Identify negative self-talk and behaviors: challenge core beliefs, myths, and actions  Improve concentration, focus, and mindfulness in daily activities   Feel less fidgety, restless or slow in daily activities / interpersonal interactions.  Status: New - Date: 5/6/22     Intervention(s)  Therapist will make referral to individual talk therapy services.       Patient has reviewed and agreed to the above plan.      Shawn G. Ullrich, [facility]  May 6, 2022

## 2022-05-06 ENCOUNTER — OFFICE VISIT (OUTPATIENT)
Dept: BEHAVIORAL HEALTH | Facility: CLINIC | Age: 32
End: 2022-05-06
Payer: COMMERCIAL

## 2022-05-06 DIAGNOSIS — F41.9 ANXIETY DISORDER, UNSPECIFIED TYPE: ICD-10-CM

## 2022-05-06 DIAGNOSIS — F33.1 MAJOR DEPRESSIVE DISORDER, RECURRENT EPISODE, MODERATE (H): Primary | ICD-10-CM

## 2022-05-06 DIAGNOSIS — F12.20 MODERATE CANNABIS USE DISORDER (H): ICD-10-CM

## 2022-05-06 NOTE — Clinical Note
I submitted referral to long-term individual therapy per request of pt.  Pt is open to return to [facility] services in the future if needed.   Mike may schedule appt with you for medication evaluation.  Manjit

## 2022-12-16 NOTE — PROGRESS NOTES
"  Assessment & Plan   Problem List Items Addressed This Visit    None  Visit Diagnoses     Anxiety    -  Primary    Relevant Medications    citalopram (CELEXA) 10 MG tablet    ALPRAZolam (XANAX) 0.25 MG tablet        Will restart citalopram today with ramp from 10 to 20 mg daily over the next few weeks. Also agreed to small Rx of Xanax. Will monitor closely for safety and efficacy. We did discuss possible trial of SNRI vs adding buspar, but will hold off for now.      32 minutes spent on the date of the encounter doing chart review, history and exam, documentation and further activities as noted.    Memo Vargas MD  [facility]    Rick Mckeon is a 32 year old presenting for the following health issues:  Anxiety and Depression      HPI   Anxiety and depression follow up  - current meds    - none  - saw  back in the spring of this year, didn't feel like that was helping  - now seeing a different therapist, just started so not sure yet how that's going  - has a history of depression, has taken citalopram for this in the past and thinks it \"probably\" helped  - at one point, was on lithium for awhile but didn't like the way he felt on that medication  - this time symptoms are a little different with more anxiety related to work  - manages a big team, lots of travel, long hours    Review of Systems   Constitutional, HEENT, cardiovascular, pulmonary, gi and gu systems are negative, except as otherwise noted.      Objective    /82   Pulse 68   Temp 97  F (36.1  C)   Ht 1.86 m (6' 1.23\")   Wt 76.7 kg (169 lb)   SpO2 97%   BMI 22.16 kg/m    Body mass index is 22.16 kg/m .  Physical Exam   GENERAL: healthy, alert and no distress  RESP: lungs clear to auscultation - no rales, rhonchi or wheezes  CV: regular rate and rhythm, normal S1 S2, no S3 or S4, no murmur, click or rub, no peripheral edema and peripheral pulses strong  MS: no gross musculoskeletal defects noted, no edema              "

## 2022-12-19 ENCOUNTER — OFFICE VISIT (OUTPATIENT)
Dept: FAMILY MEDICINE | Facility: CLINIC | Age: 32
End: 2022-12-19
Payer: COMMERCIAL

## 2022-12-19 VITALS
HEIGHT: 73 IN | HEART RATE: 68 BPM | OXYGEN SATURATION: 97 % | BODY MASS INDEX: 22.4 KG/M2 | TEMPERATURE: 97 F | DIASTOLIC BLOOD PRESSURE: 82 MMHG | WEIGHT: 169 LBS | SYSTOLIC BLOOD PRESSURE: 121 MMHG

## 2022-12-19 DIAGNOSIS — F41.9 ANXIETY: Primary | ICD-10-CM

## 2022-12-19 RX ORDER — CITALOPRAM HYDROBROMIDE 10 MG/1
TABLET ORAL
Qty: 53 TABLET | Refills: 0 | Status: SHIPPED | OUTPATIENT
Start: 2022-12-19 | End: 2023-01-16

## 2022-12-19 RX ORDER — ALPRAZOLAM 0.25 MG
0.25 TABLET ORAL 3 TIMES DAILY PRN
Qty: 10 TABLET | Refills: 0 | Status: SHIPPED | OUTPATIENT
Start: 2022-12-19 | End: 2023-02-07

## 2022-12-19 ASSESSMENT — ANXIETY QUESTIONNAIRES
IF YOU CHECKED OFF ANY PROBLEMS ON THIS QUESTIONNAIRE, HOW DIFFICULT HAVE THESE PROBLEMS MADE IT FOR YOU TO DO YOUR WORK, TAKE CARE OF THINGS AT HOME, OR GET ALONG WITH OTHER PEOPLE: VERY DIFFICULT
GAD7 TOTAL SCORE: 15
6. BECOMING EASILY ANNOYED OR IRRITABLE: MORE THAN HALF THE DAYS
2. NOT BEING ABLE TO STOP OR CONTROL WORRYING: NEARLY EVERY DAY
1. FEELING NERVOUS, ANXIOUS, OR ON EDGE: NEARLY EVERY DAY
5. BEING SO RESTLESS THAT IT IS HARD TO SIT STILL: NOT AT ALL
GAD7 TOTAL SCORE: 15
3. WORRYING TOO MUCH ABOUT DIFFERENT THINGS: NEARLY EVERY DAY
7. FEELING AFRAID AS IF SOMETHING AWFUL MIGHT HAPPEN: MORE THAN HALF THE DAYS

## 2022-12-19 ASSESSMENT — PATIENT HEALTH QUESTIONNAIRE - PHQ9
5. POOR APPETITE OR OVEREATING: MORE THAN HALF THE DAYS
SUM OF ALL RESPONSES TO PHQ QUESTIONS 1-9: 15

## 2023-01-13 NOTE — PROGRESS NOTES
"Mike is a 32 year old who is being evaluated via a billable video visit.    Can you please confirm what state you are currently located in? [place]     How would you like to obtain your AVS? MyChart  If the video visit is dropped, the invitation should be resent by: Text to cell phone: [phone]  Will anyone else be joining your video visit? No        Assessment & Plan   Problem List Items Addressed This Visit    None  Visit Diagnoses     Anxiety    -  Primary    Relevant Medications    citalopram (CELEXA) 20 MG tablet         Agreed to refill of med for another month. Discussed possible alternative/augmentive therapies and could revisit these as indicated. No need for Xanax refill today    23 minutes spent on the date of the encounter doing chart review, history and exam, documentation and further activities as noted.    Memo Vargas MD  [facility]    Subjective   Mike is a 32 year old presenting for the following health issues:  Recheck Medication (Follow-up for previous visit. PHQ and FELICIA completed.)      HPI   \"follow up\"  depression and anxiety  - started on citalopram 10 increased to 20  - notable sexual side effects  - not sure if it is helping much, but he would like to to continue with the citalopram at this dose for another month before considering any further changes    PHQ 4/26/2022 12/19/2022 1/16/2023   PHQ-9 Total Score 13 15 11   Q9: Thoughts of better off dead/self-harm past 2 weeks Several days Several days Several days   F/U: Thoughts of suicide or self-harm Yes - Yes   F/U: Self harm-plan No - No   F/U: Self-harm action No - No   F/U: Safety concerns No - No     Denies active SI or concerns for self harm.    FELICIA-7 SCORE 4/26/2022 12/19/2022 1/16/2023   Total Score 12 (moderate anxiety) - 15 (severe anxiety)   Total Score 12 15 15       Review of Systems   Constitutional, HEENT, cardiovascular, pulmonary, gi and gu systems are negative, except as otherwise noted.      Objective     "       Vitals:  No vitals were obtained today due to virtual visit.    Physical Exam   GENERAL: Healthy, alert and no distress  EYES: Eyes grossly normal to inspection.  No discharge or erythema, or obvious scleral/conjunctival abnormalities.  RESP: No audible wheeze, cough, or visible cyanosis.  No visible retractions or increased work of breathing.    SKIN: Visible skin clear. No significant rash, abnormal pigmentation or lesions.  NEURO: Cranial nerves grossly intact.  Mentation and speech appropriate for age.  PSYCH: Mentation appears normal, affect normal/bright, judgement and insight intact, normal speech and appearance well-groomed.        Video-Visit Details    Type of service:  Video Visit     Originating Location (pt. Location): Home  Distant Location (provider location):  On-site  Platform used for Video Visit: [employer]    Answers for HPI/ROS submitted by the patient on 1/16/2023  If you checked off any problems, how difficult have these problems made it for you to do your work, take care of things at home, or get along with other people?: Very difficult  PHQ9 TOTAL SCORE: 11  FELICIA 7 TOTAL SCORE: 15

## 2023-01-16 ENCOUNTER — VIRTUAL VISIT (OUTPATIENT)
Dept: FAMILY MEDICINE | Facility: CLINIC | Age: 33
End: 2023-01-16

## 2023-01-16 DIAGNOSIS — F41.9 ANXIETY: Primary | ICD-10-CM

## 2023-01-16 RX ORDER — CITALOPRAM HYDROBROMIDE 20 MG/1
20 TABLET ORAL DAILY
Qty: 30 TABLET | Refills: 0 | Status: SHIPPED | OUTPATIENT
Start: 2023-01-16 | End: 2023-02-06

## 2023-01-16 ASSESSMENT — ANXIETY QUESTIONNAIRES
7. FEELING AFRAID AS IF SOMETHING AWFUL MIGHT HAPPEN: MORE THAN HALF THE DAYS
5. BEING SO RESTLESS THAT IT IS HARD TO SIT STILL: NOT AT ALL
GAD7 TOTAL SCORE: 15
7. FEELING AFRAID AS IF SOMETHING AWFUL MIGHT HAPPEN: MORE THAN HALF THE DAYS
1. FEELING NERVOUS, ANXIOUS, OR ON EDGE: NEARLY EVERY DAY
IF YOU CHECKED OFF ANY PROBLEMS ON THIS QUESTIONNAIRE, HOW DIFFICULT HAVE THESE PROBLEMS MADE IT FOR YOU TO DO YOUR WORK, TAKE CARE OF THINGS AT HOME, OR GET ALONG WITH OTHER PEOPLE: VERY DIFFICULT
GAD7 TOTAL SCORE: 15
3. WORRYING TOO MUCH ABOUT DIFFERENT THINGS: NEARLY EVERY DAY
GAD7 TOTAL SCORE: 15
8. IF YOU CHECKED OFF ANY PROBLEMS, HOW DIFFICULT HAVE THESE MADE IT FOR YOU TO DO YOUR WORK, TAKE CARE OF THINGS AT HOME, OR GET ALONG WITH OTHER PEOPLE?: VERY DIFFICULT
4. TROUBLE RELAXING: MORE THAN HALF THE DAYS
2. NOT BEING ABLE TO STOP OR CONTROL WORRYING: NEARLY EVERY DAY
6. BECOMING EASILY ANNOYED OR IRRITABLE: MORE THAN HALF THE DAYS

## 2023-01-16 ASSESSMENT — PATIENT HEALTH QUESTIONNAIRE - PHQ9
SUM OF ALL RESPONSES TO PHQ QUESTIONS 1-9: 11
SUM OF ALL RESPONSES TO PHQ QUESTIONS 1-9: 11
10. IF YOU CHECKED OFF ANY PROBLEMS, HOW DIFFICULT HAVE THESE PROBLEMS MADE IT FOR YOU TO DO YOUR WORK, TAKE CARE OF THINGS AT HOME, OR GET ALONG WITH OTHER PEOPLE: VERY DIFFICULT

## 2023-02-06 DIAGNOSIS — F41.9 ANXIETY: ICD-10-CM

## 2023-02-06 RX ORDER — CITALOPRAM HYDROBROMIDE 20 MG/1
20 TABLET ORAL DAILY
Qty: 30 TABLET | Refills: 1 | Status: SHIPPED | OUTPATIENT
Start: 2023-02-06 | End: 2023-04-04

## 2023-02-06 NOTE — CONFIDENTIAL NOTE
Medication requested: citalopram (CELEXA) 20 MG tablet  Last office visit: 12/19/2022  [facility] appointments: none  Medication last refilled: 1/16/2023; 30 + 0 refills  Last qualifying labs:     PHQ-9 and GAD7 Scores 1/16/2023   PHQ9 TOTAL SCORE 11 (Moderate depression)   PHQ-9 Total Score 11   FELICIA-7 Total Score 15     Prescription approved per [facility] Refill Protocol.    LESLY Dickson, RN  02/06/23, 12:02 PM

## 2023-02-07 DIAGNOSIS — F41.9 ANXIETY: ICD-10-CM

## 2023-02-07 RX ORDER — ALPRAZOLAM 0.25 MG
0.25 TABLET ORAL 3 TIMES DAILY PRN
Qty: 10 TABLET | Refills: 0 | Status: SHIPPED | OUTPATIENT
Start: 2023-02-07 | End: 2023-04-01

## 2023-02-07 NOTE — TELEPHONE ENCOUNTER
Alprazolam (Xanax) 0.25 mg    Last Office Visit: 1/16/23  Future [facility] Appointments: None  Medication last refilled: 12/19/22 #10 with 0 refill(s)     verified - last fill date: 12/19/22 #10    FELICIA-7 SCORE 4/26/2022 12/19/2022 1/16/2023   Total Score 12 15 15     Routing refill request to provider for review/approval because:  Drug not on the [facility] refill protocol     VANESSA BoltonN, RN, CCM

## 2023-02-24 NOTE — PROGRESS NOTES
Mike is a 32 year old who is being evaluated via a billable video visit.      How would you like to obtain your AVS? MyChart  If the video visit is dropped, the invitation should be resent by: Text to cell phone: [phone]  Will anyone else be joining your video visit? No          Assessment & Plan   Problem List Items Addressed This Visit    None  Visit Diagnoses     Anxiety and depression    -  Primary         No need for refills of medications at this time, but I am OK with refilling citalopram at this dose for #90 +3 refills.     I discussed with Mike that I would prefer not to write for more than #10 per month of the alprazolam at 0.25mg to avoid developing dependence of this medication. Will continue to monitor use.     36 minutes spent on the date of the encounter doing chart review, history and exam, documentation and further activities as noted.    Memo Vargas MD  [facility]    Subjective   Mike is a 32 year old presenting for the following health issues:  Follow Up (Medications for anxiety/depression)      HPI   Anxiety and depression  - current meds:    - citalopram 20 mg daily    - alprazolam 0.25mg PRN (last prescribed #10 tablets on 2/7/23)  - Mike is seeing a therapist and continues to feel that the citalopram is helping  - he does acknowledge ongoing sexual side effects with citalopram but is not bothered by this at this time (we have previously discussed possibly adding Wellbutrin to his med regimen but Mike continues to defer this option)  - also acknowledges there have been some panic attack moments and that the alprazolam has been very helpful in addressing those; he still thinks he has 3 or 4 pills left from the last written prescription.  PHQ-9 score:    PHQ 2/27/2023   PHQ-9 Total Score 11   Q9: Thoughts of better off dead/self-harm past 2 weeks Several days   F/U: Thoughts of suicide or self-harm Yes   F/U: Self harm-plan No   F/U: Self-harm action No   F/U: Safety  concerns No     Mike insists no active SI and no concerns for self harm at this time. He feels he has a good support network in place and has access to emergency resources if needed.     FELICIA-7 SCORE 12/19/2022 1/16/2023 2/27/2023   Total Score - 15 (severe anxiety) 11 (moderate anxiety)   Total Score 15 15 11       Review of Systems   Constitutional, HEENT, cardiovascular, pulmonary, gi and gu systems are negative, except as otherwise noted.      Objective           Vitals:  No vitals were obtained today due to virtual visit.    Physical Exam   GENERAL: Healthy, alert and no distress  EYES: Eyes grossly normal to inspection.  No discharge or erythema, or obvious scleral/conjunctival abnormalities.  RESP: No audible wheeze, cough, or visible cyanosis.  No visible retractions or increased work of breathing.    SKIN: Visible skin clear. No significant rash, abnormal pigmentation or lesions.  NEURO: Cranial nerves grossly intact.  Mentation and speech appropriate for age.  PSYCH: Mentation appears normal, affect normal/bright, judgement and insight intact, normal speech and appearance well-groomed.          Video-Visit Details    Type of service:  Video Visit     Originating Location (pt. Location): Home    Distant Location (provider location):  On-site  Platform used for Video Visit: [employer]    Answers for HPI/ROS submitted by the patient on 2/27/2023  If you checked off any problems, how difficult have these problems made it for you to do your work, take care of things at home, or get along with other people?: Very difficult  PHQ9 TOTAL SCORE: 11  FELICIA 7 TOTAL SCORE: 11

## 2023-02-27 ENCOUNTER — VIRTUAL VISIT (OUTPATIENT)
Dept: FAMILY MEDICINE | Facility: CLINIC | Age: 33
End: 2023-02-27

## 2023-02-27 DIAGNOSIS — F41.9 ANXIETY AND DEPRESSION: Primary | ICD-10-CM

## 2023-02-27 DIAGNOSIS — F32.A ANXIETY AND DEPRESSION: Primary | ICD-10-CM

## 2023-02-27 ASSESSMENT — ANXIETY QUESTIONNAIRES
IF YOU CHECKED OFF ANY PROBLEMS ON THIS QUESTIONNAIRE, HOW DIFFICULT HAVE THESE PROBLEMS MADE IT FOR YOU TO DO YOUR WORK, TAKE CARE OF THINGS AT HOME, OR GET ALONG WITH OTHER PEOPLE: VERY DIFFICULT
4. TROUBLE RELAXING: SEVERAL DAYS
2. NOT BEING ABLE TO STOP OR CONTROL WORRYING: NEARLY EVERY DAY
GAD7 TOTAL SCORE: 11
6. BECOMING EASILY ANNOYED OR IRRITABLE: SEVERAL DAYS
7. FEELING AFRAID AS IF SOMETHING AWFUL MIGHT HAPPEN: NOT AT ALL
5. BEING SO RESTLESS THAT IT IS HARD TO SIT STILL: NOT AT ALL
GAD7 TOTAL SCORE: 11
7. FEELING AFRAID AS IF SOMETHING AWFUL MIGHT HAPPEN: NOT AT ALL
3. WORRYING TOO MUCH ABOUT DIFFERENT THINGS: NEARLY EVERY DAY
1. FEELING NERVOUS, ANXIOUS, OR ON EDGE: NEARLY EVERY DAY
GAD7 TOTAL SCORE: 11
8. IF YOU CHECKED OFF ANY PROBLEMS, HOW DIFFICULT HAVE THESE MADE IT FOR YOU TO DO YOUR WORK, TAKE CARE OF THINGS AT HOME, OR GET ALONG WITH OTHER PEOPLE?: VERY DIFFICULT

## 2023-02-27 NOTE — NURSING NOTE
32 year old  Chief Complaint   Patient presents with     Follow Up     Medications for anxiety/depression       There were no vitals taken for this visit. There is no height or weight on file to calculate BMI.  Patient Active Problem List   Diagnosis     Keratoconus of both eyes       Wt Readings from Last 2 Encounters:   12/19/22 76.7 kg (169 lb)   04/14/22 72.6 kg (160 lb)     BP Readings from Last 3 Encounters:   12/19/22 121/82   04/14/22 112/78   10/01/20 112/69         Current Outpatient Medications   Medication     ALPRAZolam (XANAX) 0.25 MG tablet     citalopram (CELEXA) 20 MG tablet     No current facility-administered medications for this visit.       Social History     Tobacco Use     Smoking status: Never     Smokeless tobacco: Never     Tobacco comments:     E-cigarettes   Vaping Use     Vaping Use: Every day     Substances: Nicotine   Substance Use Topics     Alcohol use: Yes     Comment: 2-3 drinks per week     Drug use: Yes     Types: Marijuana       Health Maintenance Due   Topic Date Due     NICOTINE/TOBACCO CESSATION COUNSELING Q 1 YR  Never done     DEPRESSION ACTION PLAN  Never done     HEPATITIS C SCREENING  Never done       No results found for: PAP      February 27, 2023 4:37 PM

## 2023-04-01 ENCOUNTER — MYC REFILL (OUTPATIENT)
Dept: FAMILY MEDICINE | Facility: CLINIC | Age: 33
End: 2023-04-01

## 2023-04-01 DIAGNOSIS — F41.9 ANXIETY: ICD-10-CM

## 2023-04-03 RX ORDER — ALPRAZOLAM 0.25 MG
0.25 TABLET ORAL 3 TIMES DAILY PRN
Qty: 10 TABLET | Refills: 0 | Status: SHIPPED | OUTPATIENT
Start: 2023-04-03 | End: 2023-05-10

## 2023-04-03 NOTE — TELEPHONE ENCOUNTER
Medication requested: ALPRAZolam (XANAX) 0.25 MG tablet  Last office visit: 12/19/22  [facility] appointments: 4/4/23  Medication last refilled: 2/7/23; 10 + 0 refills, last sold 2/14/23 per   Last qualifying labs:    2/27/2023   PHQ-9 / FELICIA-7 Scores 8/2015 to present    FELICIA-7 Score DocFlow 11       2/27/2023   PHQ-9 / FELICIA-7 Scores 8/2015 to present    PHQ-9 Score DocFlow 11      Routing refill request to provider for review/approval because:  Drug not on the [facility] refill protocol     Oscar GRACE, RN  04/03/23 11:50 AM

## 2023-04-03 NOTE — TELEPHONE ENCOUNTER
reviewed, no concerns. Med refilled as requested.     Mike was seen today for refill request.    Diagnoses and all orders for this visit:    Anxiety  -     ALPRAZolam (XANAX) 0.25 MG tablet; Take 1 tablet (0.25 mg) by mouth 3 times daily as needed for anxiety      Memo Vargas MD  12:00 PM, April 3, 2023

## 2023-04-03 NOTE — PROGRESS NOTES
Assessment & Plan   Problem List Items Addressed This Visit    None  Visit Diagnoses     Chest pain, unspecified type    -  Primary    Relevant Orders    EKG 12-lead complete w/read - Clinics (Completed)    Anxiety        Relevant Medications    citalopram (CELEXA) 20 MG tablet         EKG is reassuring against acute cardiac ischemia. Low suspicion for PE. More likely musculoskeletal strain. Symptoms improving. Encouraged Mike to contact clinic with any acute, concerning changes.     Citalopram refill as requested.     32 minutes spent on the date of the encounter doing chart review, history and exam, documentation and further activities as noted.    Memo Vargas MD  [facility]    Subjective   Mike is a 32 year old, presenting for the following health issues:  chest congestion  (Cough/cold/congestion in chest, dark green sputum 3/31, also notes that maybe strained muscles in chest/back when skiing end of march)         View : No data to display.              HPI   Cough with congestion  - was skiing maybe 8 days ago and started to feel mild chest and thoracic back pain  - 4 days ago developed a URI which moved down into his lungs  - now coughing up green phlegm  - does feel like he is getting better        1/16/2023    10:08 AM 2/27/2023     4:31 PM 4/4/2023     1:08 PM   PHQ   PHQ-9 Total Score 11 11 15   Q9: Thoughts of better off dead/self-harm past 2 weeks Several days Several days Several days   F/U: Thoughts of suicide or self-harm Yes Yes    F/U: Self harm-plan No No    F/U: Self-harm action No No    F/U: Safety concerns No No      Patient denies concerns for SI or self harm        1/16/2023    10:09 AM 2/27/2023     4:31 PM 4/4/2023     1:08 PM   FELICIA-7 SCORE   Total Score 15 (severe anxiety) 11 (moderate anxiety)    Total Score 15 11 16       Review of Systems   Constitutional, HEENT, cardiovascular, pulmonary, gi and gu systems are negative, except as otherwise noted.      Objective    BP  120/75 (BP Location: Right arm, Patient Position: Sitting, Cuff Size: Adult Regular)   Pulse 77   Temp 97.3  F (36.3  C) (Skin)   Resp 15   Wt 74.4 kg (164 lb)   SpO2 97%   BMI 21.50 kg/m    Body mass index is 21.5 kg/m .  Physical Exam   GENERAL: healthy, alert and no distress  NECK: no adenopathy, no asymmetry, masses, or scars and thyroid normal to palpation  RESP: lungs clear to auscultation - no rales, rhonchi or wheezes  CV: regular rate and rhythm, normal S1 S2, no S3 or S4, no murmur, click or rub, no peripheral edema and peripheral pulses strong  ABDOMEN: soft, nontender, no hepatosplenomegaly, no masses and bowel sounds normal  MS: no gross musculoskeletal defects noted, no edema

## 2023-04-04 ENCOUNTER — OFFICE VISIT (OUTPATIENT)
Dept: FAMILY MEDICINE | Facility: CLINIC | Age: 33
End: 2023-04-04

## 2023-04-04 VITALS
WEIGHT: 164 LBS | DIASTOLIC BLOOD PRESSURE: 75 MMHG | RESPIRATION RATE: 15 BRPM | SYSTOLIC BLOOD PRESSURE: 120 MMHG | OXYGEN SATURATION: 97 % | BODY MASS INDEX: 21.5 KG/M2 | HEART RATE: 77 BPM | TEMPERATURE: 97.3 F

## 2023-04-04 DIAGNOSIS — F41.9 ANXIETY: ICD-10-CM

## 2023-04-04 DIAGNOSIS — R07.9 CHEST PAIN, UNSPECIFIED TYPE: Primary | ICD-10-CM

## 2023-04-04 RX ORDER — CITALOPRAM HYDROBROMIDE 20 MG/1
20 TABLET ORAL DAILY
Qty: 90 TABLET | Refills: 3 | Status: SHIPPED | OUTPATIENT
Start: 2023-04-04 | End: 2024-04-29

## 2023-04-04 ASSESSMENT — ANXIETY QUESTIONNAIRES
3. WORRYING TOO MUCH ABOUT DIFFERENT THINGS: NEARLY EVERY DAY
7. FEELING AFRAID AS IF SOMETHING AWFUL MIGHT HAPPEN: SEVERAL DAYS
2. NOT BEING ABLE TO STOP OR CONTROL WORRYING: NEARLY EVERY DAY
5. BEING SO RESTLESS THAT IT IS HARD TO SIT STILL: NOT AT ALL
IF YOU CHECKED OFF ANY PROBLEMS ON THIS QUESTIONNAIRE, HOW DIFFICULT HAVE THESE PROBLEMS MADE IT FOR YOU TO DO YOUR WORK, TAKE CARE OF THINGS AT HOME, OR GET ALONG WITH OTHER PEOPLE: VERY DIFFICULT
1. FEELING NERVOUS, ANXIOUS, OR ON EDGE: NEARLY EVERY DAY
6. BECOMING EASILY ANNOYED OR IRRITABLE: NEARLY EVERY DAY
GAD7 TOTAL SCORE: 16
GAD7 TOTAL SCORE: 16

## 2023-04-04 ASSESSMENT — PATIENT HEALTH QUESTIONNAIRE - PHQ9
SUM OF ALL RESPONSES TO PHQ QUESTIONS 1-9: 15
5. POOR APPETITE OR OVEREATING: NEARLY EVERY DAY

## 2023-04-04 NOTE — NURSING NOTE
32 year old  Chief Complaint   Patient presents with     chest congestion      Cough/cold/congestion in chest, dark green sputum 3/31, also notes that maybe strained muscles in chest/back when skiing end of march       Blood pressure 120/75, pulse 77, temperature 97.3  F (36.3  C), temperature source Skin, resp. rate 15, weight 74.4 kg (164 lb), SpO2 97 %. Body mass index is 21.5 kg/m .  Patient Active Problem List   Diagnosis     Keratoconus of both eyes       Wt Readings from Last 2 Encounters:   04/04/23 74.4 kg (164 lb)   12/19/22 76.7 kg (169 lb)     BP Readings from Last 3 Encounters:   04/04/23 120/75   12/19/22 121/82   04/14/22 112/78         Current Outpatient Medications   Medication     ALPRAZolam (XANAX) 0.25 MG tablet     citalopram (CELEXA) 20 MG tablet     No current facility-administered medications for this visit.       Social History     Tobacco Use     Smoking status: Every Day     Types: Vaping Device     Smokeless tobacco: Never     Tobacco comments:     E-cigarettes   Vaping Use     Vaping status: Every Day     Substances: Nicotine   Substance Use Topics     Alcohol use: Yes     Comment: 2-3 drinks per week     Drug use: Yes     Types: Marijuana       Health Maintenance Due   Topic Date Due     NICOTINE/TOBACCO CESSATION COUNSELING Q 1 YR  Never done     DEPRESSION ACTION PLAN  Never done     HEPATITIS C SCREENING  Never done     YEARLY PREVENTIVE VISIT  04/14/2023       No results found for: PAP      April 4, 2023 11:33 AM

## 2023-05-10 ENCOUNTER — MYC REFILL (OUTPATIENT)
Dept: FAMILY MEDICINE | Facility: CLINIC | Age: 33
End: 2023-05-10

## 2023-05-10 DIAGNOSIS — F41.9 ANXIETY: ICD-10-CM

## 2023-05-10 RX ORDER — ALPRAZOLAM 0.25 MG
0.25 TABLET ORAL 3 TIMES DAILY PRN
Qty: 10 TABLET | Refills: 0 | Status: SHIPPED | OUTPATIENT
Start: 2023-05-10 | End: 2023-06-02

## 2023-05-10 NOTE — TELEPHONE ENCOUNTER
reviewed, no concerns. Med refilled as requested.    Mike was seen today for refill request.    Diagnoses and all orders for this visit:    Anxiety  -     ALPRAZolam (XANAX) 0.25 MG tablet; Take 1 tablet (0.25 mg) by mouth 3 times daily as needed for anxiety      Memo Vargas MD  1:58 PM, May 10, 2023

## 2023-05-10 NOTE — TELEPHONE ENCOUNTER
Alprazolam (Xanax) 0.25 mg    Last Office Visit: 4/4/23  [facility] [place] Appointments: None  Medication last refilled: 4/3/23 #10 with 0 refill(s)     verified - last fill date: 4/4/23 #10    Routing refill request to provider for review/approval because:  Drug not on the G refill protocol     VANESSA BoltonN, RN, CCM

## 2023-06-02 ENCOUNTER — HEALTH MAINTENANCE LETTER (OUTPATIENT)
Age: 33
End: 2023-06-02

## 2023-06-02 ENCOUNTER — MYC REFILL (OUTPATIENT)
Dept: FAMILY MEDICINE | Facility: CLINIC | Age: 33
End: 2023-06-02

## 2023-06-02 DIAGNOSIS — F41.9 ANXIETY: ICD-10-CM

## 2023-06-02 RX ORDER — ALPRAZOLAM 0.25 MG
0.25 TABLET ORAL 3 TIMES DAILY PRN
Qty: 10 TABLET | Refills: 0 | Status: SHIPPED | OUTPATIENT
Start: 2023-06-02 | End: 2023-07-26

## 2023-06-02 NOTE — TELEPHONE ENCOUNTER
reviewed. Early refill as I would like to see this Rx last a full 30 days between refills. Advised Mike of this via OLED-T. Will monitor for consistent early refills and would plan to meet with Mike to consider safer alternatives as needed.     Mike was seen today for refill request.    Diagnoses and all orders for this visit:    Anxiety  -     ALPRAZolam (XANAX) 0.25 MG tablet; Take 1 tablet (0.25 mg) by mouth 3 times daily as needed for anxiety      Memo Vargas MD  3:30 PM, June 2, 2023

## 2023-06-02 NOTE — TELEPHONE ENCOUNTER
Alprazolam (Xanax) 0.25 mg    Last Office Visit: 4/4/23  [place] [facility] [place]  Medication last refilled: 5/10/23 #10 with 0 refill(s)     verified - last fill date: 5/13/23 #10    Routing refill request to provider for review/approval because:  Drug not on the G refill protocol     VANESSA BoltonN, RN, CCM

## 2023-07-20 ENCOUNTER — OFFICE VISIT (OUTPATIENT)
Dept: FAMILY MEDICINE | Facility: CLINIC | Age: 33
End: 2023-07-20
Payer: COMMERCIAL

## 2023-07-20 VITALS
OXYGEN SATURATION: 96 % | HEART RATE: 83 BPM | HEIGHT: 73 IN | WEIGHT: 169 LBS | DIASTOLIC BLOOD PRESSURE: 69 MMHG | BODY MASS INDEX: 22.4 KG/M2 | TEMPERATURE: 97.9 F | SYSTOLIC BLOOD PRESSURE: 129 MMHG

## 2023-07-20 DIAGNOSIS — R21 RASH: ICD-10-CM

## 2023-07-20 DIAGNOSIS — N52.9 ERECTILE DYSFUNCTION, UNSPECIFIED ERECTILE DYSFUNCTION TYPE: ICD-10-CM

## 2023-07-20 DIAGNOSIS — Z11.3 SCREEN FOR STD (SEXUALLY TRANSMITTED DISEASE): ICD-10-CM

## 2023-07-20 DIAGNOSIS — Z00.00 ROUTINE HISTORY AND PHYSICAL EXAMINATION OF ADULT: Primary | ICD-10-CM

## 2023-07-20 DIAGNOSIS — Z13.228 SCREENING FOR METABOLIC DISORDER: ICD-10-CM

## 2023-07-20 DIAGNOSIS — Z13.220 LIPID SCREENING: ICD-10-CM

## 2023-07-20 PROCEDURE — 87389 HIV-1 AG W/HIV-1&-2 AB AG IA: CPT | Mod: ORL | Performed by: FAMILY MEDICINE

## 2023-07-20 PROCEDURE — 87491 CHLMYD TRACH DNA AMP PROBE: CPT | Mod: ORL | Performed by: FAMILY MEDICINE

## 2023-07-20 PROCEDURE — 80061 LIPID PANEL: CPT | Mod: ORL | Performed by: FAMILY MEDICINE

## 2023-07-20 PROCEDURE — 86780 TREPONEMA PALLIDUM: CPT | Mod: ORL | Performed by: FAMILY MEDICINE

## 2023-07-20 PROCEDURE — 80048 BASIC METABOLIC PNL TOTAL CA: CPT | Mod: ORL | Performed by: FAMILY MEDICINE

## 2023-07-20 PROCEDURE — 87591 N.GONORRHOEAE DNA AMP PROB: CPT | Mod: ORL | Performed by: FAMILY MEDICINE

## 2023-07-20 RX ORDER — SILDENAFIL 25 MG/1
25 TABLET, FILM COATED ORAL DAILY PRN
Qty: 10 TABLET | Refills: 0 | Status: SHIPPED | OUTPATIENT
Start: 2023-07-20 | End: 2024-07-09

## 2023-07-20 RX ORDER — TRIAMCINOLONE ACETONIDE 1 MG/G
CREAM TOPICAL 2 TIMES DAILY
Qty: 30 G | Refills: 0 | Status: SHIPPED | OUTPATIENT
Start: 2023-07-20 | End: 2024-07-09

## 2023-07-20 ASSESSMENT — ENCOUNTER SYMPTOMS
HEARTBURN: 1
EYE PAIN: 0
NAUSEA: 0
HEMATURIA: 0
PARESTHESIAS: 0
PALPITATIONS: 0
HEMATOCHEZIA: 0
CONSTIPATION: 0
WEAKNESS: 0
FREQUENCY: 0
FEVER: 0
ARTHRALGIAS: 0
DIZZINESS: 0
DIARRHEA: 0
CHILLS: 0
DYSURIA: 0
SORE THROAT: 0
ABDOMINAL PAIN: 0
SHORTNESS OF BREATH: 0
JOINT SWELLING: 0
MYALGIAS: 0
COUGH: 0
HEADACHES: 0
NERVOUS/ANXIOUS: 1

## 2023-07-20 ASSESSMENT — ANXIETY QUESTIONNAIRES
IF YOU CHECKED OFF ANY PROBLEMS ON THIS QUESTIONNAIRE, HOW DIFFICULT HAVE THESE PROBLEMS MADE IT FOR YOU TO DO YOUR WORK, TAKE CARE OF THINGS AT HOME, OR GET ALONG WITH OTHER PEOPLE: VERY DIFFICULT
3. WORRYING TOO MUCH ABOUT DIFFERENT THINGS: NEARLY EVERY DAY
1. FEELING NERVOUS, ANXIOUS, OR ON EDGE: NEARLY EVERY DAY
GAD7 TOTAL SCORE: 13
2. NOT BEING ABLE TO STOP OR CONTROL WORRYING: MORE THAN HALF THE DAYS
GAD7 TOTAL SCORE: 13
7. FEELING AFRAID AS IF SOMETHING AWFUL MIGHT HAPPEN: MORE THAN HALF THE DAYS
6. BECOMING EASILY ANNOYED OR IRRITABLE: SEVERAL DAYS
4. TROUBLE RELAXING: MORE THAN HALF THE DAYS
5. BEING SO RESTLESS THAT IT IS HARD TO SIT STILL: NOT AT ALL

## 2023-07-20 ASSESSMENT — PATIENT HEALTH QUESTIONNAIRE - PHQ9
10. IF YOU CHECKED OFF ANY PROBLEMS, HOW DIFFICULT HAVE THESE PROBLEMS MADE IT FOR YOU TO DO YOUR WORK, TAKE CARE OF THINGS AT HOME, OR GET ALONG WITH OTHER PEOPLE: VERY DIFFICULT
SUM OF ALL RESPONSES TO PHQ QUESTIONS 1-9: 8
SUM OF ALL RESPONSES TO PHQ QUESTIONS 1-9: 8

## 2023-07-20 NOTE — PROGRESS NOTES
SUBJECTIVE:   CC: Mike is an 32 year old who presents for preventative health visit.     Healthy Habits:     Getting at least 3 servings of Calcium per day:  NO    Bi-annual eye exam:  Yes    Dental care twice a year:  Yes    Sleep apnea or symptoms of sleep apnea:  None    Diet:  Regular (no restrictions)    Frequency of exercise:  1 day/week    Duration of exercise:  30-45 minutes    Taking medications regularly:  Yes    Medication side effects:  Other    Additional concerns today:  Yes    # Health Maintenance  - HIV Screening: pending  - STI Screening: pending  - Hep C Screening: no current concerns  - BP:   BP Readings from Last 3 Encounters:   07/20/23 129/69   04/04/23 120/75   12/19/22 121/82   - Cholesterol: pending  Recent Labs   Lab Test 04/14/22  1517   CHOL 199   HDL 54   *   TRIG 48   The ASCVD Risk score (Brain LUNDBERG, et al., 2019) failed to calculate for the following reasons:    The 2019 ASCVD risk score is only valid for ages 40 to 79  - Diabetes Screening: pending  - Lung Cancer Screening: not indicated  55-81yo w/30py smoking history and currently smoking OR quit within past 15 years:  Low dose CT annually and discontinued once a person has been 15 years tobacco free  - (+) seatbelt use, (+) helmet, (+) smoke detector  - Feels safe at home, denies verbal/physical/emotional abuse in past year: yes      Today's PHQ-9 Score:       7/20/2023    11:52 AM   PHQ-9 SCORE   PHQ-9 Total Score MyChart 8 (Mild depression)   PHQ-9 Total Score 8       PROBLEMS TO ADD ON...  Mood concerns  - continues to take citalopram and Xanax PRN  - feels that anxiety is an issue but does not feel he wants or needs to make medication adjustments at this time  - concerns about ED, suspects from the citalopram    ED  - see suspicion for med related issues above  - wondering about possible treatment strategies    Rash in groin  - for the past 4-5 days  - has been trying some antifungal cream, doesn't think it has been  helping much  - wondering if there is something else he can try    Social History     Tobacco Use     Smoking status: Every Day     Types: Vaping Device     Smokeless tobacco: Never     Tobacco comments:     E-cigarettes   Substance Use Topics     Alcohol use: Yes     Comment: 2-3 drinks per week         7/20/2023    11:56 AM   Alcohol Use   Prescreen: >3 drinks/day or >7 drinks/week? Yes   AUDIT SCORE  6       Last PSA: No results found for: PSA    Reviewed orders with patient. Reviewed health maintenance and updated orders accordingly - Yes    Reviewed and updated as needed this visit by clinical staff   Tobacco  Allergies  Meds  Problems  Med Hx  Surg Hx  Fam Hx          Reviewed and updated as needed this visit by Provider   Tobacco  Allergies  Meds  Problems  Med Hx  Surg Hx  Fam Hx         Past Medical History:   Diagnosis Date     Anxiety and depression       Past Surgical History:   Procedure Laterality Date     RHINOPLASTY Bilateral 2008       Review of Systems   Constitutional: Negative for chills and fever.   HENT: Negative for congestion, ear pain, hearing loss and sore throat.    Eyes: Negative for pain and visual disturbance.   Respiratory: Negative for cough and shortness of breath.    Cardiovascular: Negative for chest pain, palpitations and peripheral edema.   Gastrointestinal: Positive for heartburn. Negative for abdominal pain, constipation, diarrhea, hematochezia and nausea.   Genitourinary: Positive for impotence. Negative for dysuria, frequency, genital sores, hematuria, penile discharge and urgency.   Musculoskeletal: Negative for arthralgias, joint swelling and myalgias.   Skin: Positive for rash.   Neurological: Negative for dizziness, weakness, headaches and paresthesias.   Psychiatric/Behavioral: Negative for mood changes. The patient is nervous/anxious.        OBJECTIVE:   /69 (BP Location: Right arm, Patient Position: Sitting, Cuff Size: Adult Regular)   Pulse 83    "Temp 97.9  F (36.6  C) (Temporal)   Ht 1.854 m (6' 1\")   Wt 76.7 kg (169 lb)   SpO2 96%   BMI 22.30 kg/m      Physical Exam  GENERAL: healthy, alert and no distress  NECK: no adenopathy, no asymmetry, masses, or scars and thyroid normal to palpation  RESP: lungs clear to auscultation - no rales, rhonchi or wheezes  CV: regular rate and rhythm, normal S1 S2, no S3 or S4, no murmur, click or rub, no peripheral edema and peripheral pulses strong  ABDOMEN: soft, nontender, no hepatosplenomegaly, no masses and bowel sounds normal  MS: no gross musculoskeletal defects noted, no edema  SKIN: papular lesions with associated erythema at LEFT upper thigh, no bleeding or drainage, no circumferential plaque    Diagnostic Test Results:  Labs reviewed in Epic    ASSESSMENT/PLAN:   Mike was seen today for physical.    Diagnoses and all orders for this visit:    Routine history and physical examination of adult    Screening for metabolic disorder  -     Basic metabolic panel; Future  -     Basic metabolic panel    Lipid screening  -     Lipid Profile; Future  -     Lipid Profile    Erectile dysfunction, unspecified erectile dysfunction type  -     sildenafil (VIAGRA) 25 MG tablet; Take 1 tablet (25 mg) by mouth daily as needed (erectile dysfunction)    Screen for STD (sexually transmitted disease)  -     NEISSERIA GONORRHOEA PCR; Future  -     CHLAMYDIA TRACHOMATIS PCR; Future  -     Treponema Abs w Reflex to RPR and Titer; Future  -     HIV Antigen Antibody Combo; Future  -     NEISSERIA GONORRHOEA PCR  -     CHLAMYDIA TRACHOMATIS PCR  -     Treponema Abs w Reflex to RPR and Titer  -     HIV Antigen Antibody Combo    Rash  -     triamcinolone (KENALOG) 0.1 % external cream; Apply topically 2 times daily    Rx for trial of viagra to address ED symptoms. Could consider adding Wellbutrin to mood medication regimen but will wait to see if viagra is enough.     Rash more consistent with topical dermatitis. Will switch to steroid " as noted above and monitor for improvement.     Patient has been advised of split billing requirements and indicates understanding: Yes      COUNSELING:   Reviewed preventive health counseling, as reflected in patient instructions      He reports that he has been smoking vaping device. He has never used smokeless tobacco.  Nicotine/Tobacco Cessation Plan:   Information offered: Patient not interested at this time      MD SUN Carrasquillo [facility]  Answers for HPI/ROS submitted by the patient on 7/20/2023  If you checked off any problems, how difficult have these problems made it for you to do your work, take care of things at home, or get along with other people?: Very difficult  PHQ9 TOTAL SCORE: 8  FELICIA 7 TOTAL SCORE: 13

## 2023-07-20 NOTE — NURSING NOTE
"32 year old  Chief Complaint   Patient presents with     Physical     Follow up on mental health medication, has jock itch rash he would like looked at - showed up a couple weeks ago, used OTC cream with some relief            Blood pressure 129/69, pulse 83, temperature 97.9  F (36.6  C), temperature source Temporal, height 1.854 m (6' 1\"), weight 76.7 kg (169 lb), SpO2 96 %. Body mass index is 22.3 kg/m .  Patient Active Problem List   Diagnosis     Keratoconus of both eyes            Wt Readings from Last 2 Encounters:   07/20/23 76.7 kg (169 lb)   04/04/23 74.4 kg (164 lb)     BP Readings from Last 3 Encounters:   07/20/23 129/69   04/04/23 120/75   12/19/22 121/82              Current Outpatient Medications   Medication     ALPRAZolam (XANAX) 0.25 MG tablet     citalopram (CELEXA) 20 MG tablet     No current facility-administered medications for this visit.            Social History     Tobacco Use     Smoking status: Every Day     Types: Vaping Device     Smokeless tobacco: Never     Tobacco comments:     E-cigarettes   Vaping Use     Vaping Use: Every day     Substances: Nicotine   Substance Use Topics     Alcohol use: Yes     Alcohol/week: 9.0 standard drinks of alcohol     Types: 9 Standard drinks or equivalent per week     Comment: 2-3 drinks per week     Drug use: Yes     Types: Marijuana            Health Maintenance Due   Topic Date Due     DEPRESSION ACTION PLAN  Never done     Pneumococcal Vaccine: Pediatrics (0 to 5 Years) and At-Risk Patients (6 to 64 Years) (1 - PCV) Never done     HEPATITIS C SCREENING  Never done     YEARLY PREVENTIVE VISIT  04/14/2023            No results found for: PAP           July 20, 2023 1:03 PM    "

## 2023-07-21 LAB
ANION GAP SERPL CALCULATED.3IONS-SCNC: 9 MMOL/L (ref 7–15)
BUN SERPL-MCNC: 17.8 MG/DL (ref 6–20)
C TRACH DNA SPEC QL NAA+PROBE: NEGATIVE
CALCIUM SERPL-MCNC: 9.8 MG/DL (ref 8.6–10)
CHLORIDE SERPL-SCNC: 104 MMOL/L (ref 98–107)
CHOLEST SERPL-MCNC: 207 MG/DL
CREAT SERPL-MCNC: 1.05 MG/DL (ref 0.67–1.17)
DEPRECATED HCO3 PLAS-SCNC: 28 MMOL/L (ref 22–29)
GFR SERPL CREATININE-BSD FRML MDRD: >90 ML/MIN/1.73M2
GLUCOSE SERPL-MCNC: 92 MG/DL (ref 70–99)
HDLC SERPL-MCNC: 51 MG/DL
HIV 1+2 AB+HIV1 P24 AG SERPL QL IA: NONREACTIVE
LDLC SERPL CALC-MCNC: 141 MG/DL
N GONORRHOEA DNA SPEC QL NAA+PROBE: NEGATIVE
NONHDLC SERPL-MCNC: 156 MG/DL
POTASSIUM SERPL-SCNC: 4.6 MMOL/L (ref 3.4–5.3)
SODIUM SERPL-SCNC: 141 MMOL/L (ref 136–145)
T PALLIDUM AB SER QL: NONREACTIVE
TRIGL SERPL-MCNC: 77 MG/DL

## 2023-07-26 ENCOUNTER — MYC REFILL (OUTPATIENT)
Dept: FAMILY MEDICINE | Facility: CLINIC | Age: 33
End: 2023-07-26

## 2023-07-26 DIAGNOSIS — F41.9 ANXIETY: ICD-10-CM

## 2023-07-26 RX ORDER — ALPRAZOLAM 0.25 MG
0.25 TABLET ORAL 3 TIMES DAILY PRN
Qty: 10 TABLET | Refills: 0 | Status: SHIPPED | OUTPATIENT
Start: 2023-07-26 | End: 2023-09-21

## 2023-07-26 NOTE — TELEPHONE ENCOUNTER
Alprazolam (Xanax) 0.25 mg    Last Office Visit: 7/20/23  Future [facility] [place] Appointments: None  Medication last refilled: 6/2/23 #10 with 0 refill(s)     verified - last fill date: 6/3/23 #10    Routing refill request to provider for review/approval because:  Drug not on the G refill protocol     VANESSA BoltonN, RN, CCM

## 2023-09-21 ENCOUNTER — MYC REFILL (OUTPATIENT)
Dept: FAMILY MEDICINE | Facility: CLINIC | Age: 33
End: 2023-09-21

## 2023-09-21 DIAGNOSIS — F41.9 ANXIETY: ICD-10-CM

## 2023-09-21 RX ORDER — ALPRAZOLAM 0.25 MG
0.25 TABLET ORAL 3 TIMES DAILY PRN
Qty: 10 TABLET | Refills: 0 | Status: SHIPPED | OUTPATIENT
Start: 2023-09-21 | End: 2023-11-29

## 2023-09-21 NOTE — TELEPHONE ENCOUNTER
reviewed, no concerns. Med refilled as requested.     Mike was seen today for refill request.    Diagnoses and all orders for this visit:    Anxiety  -     ALPRAZolam (XANAX) 0.25 MG tablet; Take 1 tablet (0.25 mg) by mouth 3 times daily as needed for anxiety      Memo Vargas MD  6:07 PM, September 21, 2023    
Medication requested: ALPRAZolam (XANAX) 0.25 MG tablet   Last office visit: 7/20/23  [facility] appointments: none  Medication last refilled: 7/26/23; 10 + 0 refills, last sold 8/5/23 per   Last qualifying labs:    7/20/2023  11:53 AM   PHQ-9 / FELICIA-7 Scores 8/2015 to present    FELCIIA-7 Score DocFlow 13      Routing refill request to provider for review/approval because:  Drug not on the G refill protocol     Oscar GRACE, RN  09/21/23 12:58 PM    
[id number]:Urgent;

## 2023-11-29 ENCOUNTER — MYC REFILL (OUTPATIENT)
Dept: FAMILY MEDICINE | Facility: CLINIC | Age: 33
End: 2023-11-29

## 2023-11-29 DIAGNOSIS — F41.9 ANXIETY: ICD-10-CM

## 2023-11-30 RX ORDER — ALPRAZOLAM 0.25 MG
0.25 TABLET ORAL 3 TIMES DAILY PRN
Qty: 10 TABLET | Refills: 0 | Status: SHIPPED | OUTPATIENT
Start: 2023-11-30 | End: 2024-01-15

## 2023-11-30 NOTE — TELEPHONE ENCOUNTER
Medication requested: ALPRAZolam (XANAX) 0.25 MG tablet   Last office visit: 7/20/23  [facility] appointments: none  Medication last refilled: 9/21/23; 10 + 0 refills, last sold 9/23/23 per   Last qualifying labs:    7/20/2023  11:53 AM   PHQ-9 / FELICIA-7 Scores 8/2015 to present    FELICIA-7 Score DocFlow 13      Routing refill request to provider for review/approval because:  Drug not on the G refill protocol     Oscar GRACE, RN  11/30/23 4:19 PM

## 2023-11-30 NOTE — TELEPHONE ENCOUNTER
reviewed, no concerns, med refilled as requested.    Mike was seen today for refill request.    Diagnoses and all orders for this visit:    Anxiety  -     ALPRAZolam (XANAX) 0.25 MG tablet; Take 1 tablet (0.25 mg) by mouth 3 times daily as needed for anxiety      Memo Vargas MD  5:17 PM, November 30, 2023 n

## 2024-01-15 ENCOUNTER — MYC REFILL (OUTPATIENT)
Dept: FAMILY MEDICINE | Facility: CLINIC | Age: 34
End: 2024-01-15

## 2024-01-15 DIAGNOSIS — F41.9 ANXIETY: ICD-10-CM

## 2024-01-17 RX ORDER — ALPRAZOLAM 0.25 MG
0.25 TABLET ORAL 3 TIMES DAILY PRN
Qty: 10 TABLET | Refills: 0 | Status: SHIPPED | OUTPATIENT
Start: 2024-01-17

## 2024-01-17 NOTE — TELEPHONE ENCOUNTER
reviewed, no concerns. Med refilled as requested.     Mike was seen today for refill request.    Diagnoses and all orders for this visit:    Anxiety  -     ALPRAZolam (XANAX) 0.25 MG tablet; Take 1 tablet (0.25 mg) by mouth 3 times daily as needed for anxiety      Memo Vargas MD  11:15 AM, January 17, 2024

## 2024-01-17 NOTE — TELEPHONE ENCOUNTER
Alprazolam (Xanax) 0.25 mg     Last Office Visit: 7/20/23  [place] [facility] [place]  Medication last refilled: 11/30/23 #10 with 0 refill(s)     verified - last fill date: 12/12/23 #10    PHQ-9 / FELICIA-7 Scores 12/19/22 2/27/23 7/20/23   FELICIA-7 Score DocFlow 15 11 13   PHQ-9 Score DocFlow 15 11 8     Routing refill request to provider for review/approval because:  Drug not on the G refill protocol     VANESSA BoltonN, RN, CCM

## 2024-02-11 ENCOUNTER — OFFICE VISIT (OUTPATIENT)
Dept: URGENT CARE | Facility: URGENT CARE | Age: 34
End: 2024-02-11
Payer: COMMERCIAL

## 2024-02-11 ENCOUNTER — ANCILLARY PROCEDURE (OUTPATIENT)
Dept: GENERAL RADIOLOGY | Facility: CLINIC | Age: 34
End: 2024-02-11
Attending: PHYSICIAN ASSISTANT
Payer: COMMERCIAL

## 2024-02-11 VITALS
TEMPERATURE: 98 F | DIASTOLIC BLOOD PRESSURE: 67 MMHG | RESPIRATION RATE: 20 BRPM | BODY MASS INDEX: 23.39 KG/M2 | SYSTOLIC BLOOD PRESSURE: 109 MMHG | WEIGHT: 177.3 LBS | OXYGEN SATURATION: 98 % | HEART RATE: 72 BPM

## 2024-02-11 DIAGNOSIS — S27.0XXA TRAUMATIC PNEUMOTHORAX, INITIAL ENCOUNTER: ICD-10-CM

## 2024-02-11 DIAGNOSIS — S22.42XA CLOSED FRACTURE OF MULTIPLE RIBS OF LEFT SIDE, INITIAL ENCOUNTER: ICD-10-CM

## 2024-02-11 DIAGNOSIS — S29.9XXA TRAUMATIC INJURY OF RIB: Primary | ICD-10-CM

## 2024-02-11 PROCEDURE — 99214 OFFICE O/P EST MOD 30 MIN: CPT | Performed by: PHYSICIAN ASSISTANT

## 2024-02-11 PROCEDURE — 71101 X-RAY EXAM UNILAT RIBS/CHEST: CPT | Mod: TC | Performed by: RADIOLOGY

## 2024-02-11 RX ORDER — IBUPROFEN 800 MG/1
800 TABLET, FILM COATED ORAL EVERY 8 HOURS PRN
Qty: 35 TABLET | Refills: 0 | Status: SHIPPED | OUTPATIENT
Start: 2024-02-11 | End: 2024-07-09

## 2024-02-11 ASSESSMENT — ENCOUNTER SYMPTOMS: SHORTNESS OF BREATH: 0

## 2024-02-11 NOTE — PROGRESS NOTES
SUBJECTIVE:   Mike Cisneros is a 33 year old male presenting with a chief complaint of   Chief Complaint   Patient presents with    Rib Pain     Left back side; fell on a stump about 90 mins ago; some redness 6/10 pain        He is an established patient of [facility].  Patient presents with left side rib pain after falling onto a stump while hiking 90 minutes ago.  No other injury.   No treatment.  No SOB.  Patient requesting xrays.  Discussed risk of radiation.          Review of Systems   Respiratory:  Negative for shortness of breath.    All other systems reviewed and are negative.      Past Medical History:   Diagnosis Date    Anxiety and depression      Family History   Problem Relation Age of Onset    Lung Cancer Maternal Grandmother     Lung Cancer Paternal Grandmother     Thyroid Cancer Sister     Hyperlipidemia Mother     Hyperlipidemia Father     Thyroid Cancer Paternal Aunt     Diabetes No family hx of      Current Outpatient Medications   Medication Sig Dispense Refill    ALPRAZolam (XANAX) 0.25 MG tablet Take 1 tablet (0.25 mg) by mouth 3 times daily as needed for anxiety 10 tablet 0    citalopram (CELEXA) 20 MG tablet Take 1 tablet (20 mg) by mouth daily 90 tablet 3    ibuprofen (ADVIL/MOTRIN) 800 MG tablet Take 1 tablet (800 mg) by mouth every 8 hours as needed for moderate pain 35 tablet 0    tiZANidine (ZANAFLEX) 4 MG tablet Take 1 tablet (4 mg) by mouth 3 times daily 25 tablet 0    sildenafil (VIAGRA) 25 MG tablet Take 1 tablet (25 mg) by mouth daily as needed (erectile dysfunction) (Patient not taking: Reported on 2/11/2024) 10 tablet 0    triamcinolone (KENALOG) 0.1 % external cream Apply topically 2 times daily (Patient not taking: Reported on 2/11/2024) 30 g 0     Social History     Tobacco Use    Smoking status: Every Day     Types: Vaping Device    Smokeless tobacco: Never    Tobacco comments:     E-cigarettes   Substance Use Topics    Alcohol use: Yes     Alcohol/week: 9.0 standard drinks of  alcohol     Types: 9 Standard drinks or equivalent per week     Comment: 2-3 drinks per week       OBJECTIVE  /67   Pulse 72   Temp 98  F (36.7  C) (Tympanic)   Resp 20   Wt 80.4 kg (177 lb 4.8 oz)   SpO2 98%   BMI 23.39 kg/m      Physical Exam  Vitals reviewed.   Constitutional:       General: He is not in acute distress.     Appearance: Normal appearance. He is normal weight. He is not ill-appearing.   Cardiovascular:      Rate and Rhythm: Normal rate and regular rhythm.      Pulses: Normal pulses.      Heart sounds: Normal heart sounds.   Pulmonary:      Breath sounds: Normal breath sounds.      Comments: No ecchymosis, no deformity.  Poor effort.  Pain with ap and lateral compression to left lower anterior ribs.      Neurological:      Mental Status: He is alert.         Labs:  No results found for this or any previous visit (from the past 24 hour(s)).   Xrays reviewed by myself and independently interpreted.  Any significant discrepancies with official radiologic read, patient will be notified.       ASSESSMENT:      ICD-10-CM    1. Traumatic injury of rib  S29.9XXA XR Ribs & Chest Left G/E 3 Views     ibuprofen (ADVIL/MOTRIN) 800 MG tablet     tiZANidine (ZANAFLEX) 4 MG tablet      2. Closed fracture of multiple ribs of left side, initial encounter  S22.42XA       3. Traumatic pneumothorax, initial encounter  S27.0XXA            Medical Decision Making:    Differential Diagnosis:  MS Injury Pain: fracture and contusion    Serious Comorbid Conditions:  Adult:   reviewed    PLAN:    Rx for ibuprofen and zanaflex. Discussed xray findings with Dr. Newsome. About 10% pneumothorax.  Patient sent to ED for further evaluation.  Patient left in stable condition.    Followup:    If not improving or if condition worsens, follow up with your Primary Care Provider, If not improving or if conditions worsens over the next 12-24 hours, go to the Emergency Department    There are no Patient Instructions on file for  this visit.

## 2024-04-29 DIAGNOSIS — F41.9 ANXIETY: ICD-10-CM

## 2024-04-30 RX ORDER — CITALOPRAM HYDROBROMIDE 20 MG/1
20 TABLET ORAL DAILY
Qty: 90 TABLET | Refills: 0 | Status: SHIPPED | OUTPATIENT
Start: 2024-04-30 | End: 2024-06-30

## 2024-04-30 NOTE — TELEPHONE ENCOUNTER
Medication requested: citalopram (CELEXA) 20 MG tablet   Last office visit: 7/20/23  [facility] appointments: none  Medication last refilled: 4/4/23; 90 + 3 refills  Last qualifying labs:    7/20/2023  11:53 AM   PHQ-9 / FELICIA-7 Scores 8/2015 to present    FELICIA-7 Score DocFlow 13       7/20/2023  11:52 AM   PHQ-9 / FELICIA-7 Scores 8/2015 to present    PHQ-9 Score DocFlow 8      Prescription approved per [facility] Refill Protocol.    Oscar GRACE, RN  04/30/24 3:53 PM

## 2024-06-30 ENCOUNTER — MYC REFILL (OUTPATIENT)
Dept: FAMILY MEDICINE | Facility: CLINIC | Age: 34
End: 2024-06-30

## 2024-06-30 DIAGNOSIS — F41.9 ANXIETY: ICD-10-CM

## 2024-07-01 RX ORDER — CITALOPRAM HYDROBROMIDE 20 MG/1
20 TABLET ORAL DAILY
Qty: 90 TABLET | Refills: 0 | Status: SHIPPED | OUTPATIENT
Start: 2024-07-01

## 2024-07-01 NOTE — TELEPHONE ENCOUNTER
Medication requested: citalopram (CELEXA) 20 MG tablet   Last office visit: 7/20/23  [facility] appointments: 7/9/24  Medication last refilled: 4/30/24; 90 + 0 refills  Last qualifying labs:    7/20/2023  11:53 AM   PHQ-9 / FELICIA-7 Scores 8/2015 to present    FELICIA-7 Score DocFlow 13       7/20/2023  11:52 AM   PHQ-9 / FELICIA-7 Scores 8/2015 to present    PHQ-9 Score DocFlow 8      Prescription approved per [facility] Refill Protocol.    Oscar GRACE, RN  07/01/24 4:10 PM

## 2024-07-09 ENCOUNTER — OFFICE VISIT (OUTPATIENT)
Dept: FAMILY MEDICINE | Facility: CLINIC | Age: 34
End: 2024-07-09
Payer: COMMERCIAL

## 2024-07-09 VITALS
SYSTOLIC BLOOD PRESSURE: 123 MMHG | RESPIRATION RATE: 16 BRPM | TEMPERATURE: 98.5 F | HEIGHT: 73 IN | BODY MASS INDEX: 22.66 KG/M2 | OXYGEN SATURATION: 99 % | WEIGHT: 171 LBS | HEART RATE: 66 BPM | DIASTOLIC BLOOD PRESSURE: 73 MMHG

## 2024-07-09 DIAGNOSIS — Z11.3 SCREEN FOR STD (SEXUALLY TRANSMITTED DISEASE): ICD-10-CM

## 2024-07-09 DIAGNOSIS — Z00.00 WELLNESS EXAMINATION: Primary | ICD-10-CM

## 2024-07-09 DIAGNOSIS — Z13.220 LIPID SCREENING: ICD-10-CM

## 2024-07-09 DIAGNOSIS — Z87.81 HISTORY OF BROKEN NOSE: ICD-10-CM

## 2024-07-09 DIAGNOSIS — Z13.228 SCREENING FOR METABOLIC DISORDER: ICD-10-CM

## 2024-07-09 PROBLEM — G89.11 ACUTE PAIN DUE TO TRAUMA: Status: ACTIVE | Noted: 2024-02-12

## 2024-07-09 PROBLEM — S22.42XA FRACTURE OF MULTIPLE RIBS OF LEFT SIDE: Status: ACTIVE | Noted: 2024-02-11

## 2024-07-09 PROBLEM — S27.2XXA TRAUMATIC PNEUMOHEMOTHORAX: Status: ACTIVE | Noted: 2024-02-11
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
## 2024-07-09 PROCEDURE — 87389 HIV-1 AG W/HIV-1&-2 AB AG IA: CPT | Mod: ORL | Performed by: FAMILY MEDICINE

## 2024-07-09 PROCEDURE — 87491 CHLMYD TRACH DNA AMP PROBE: CPT | Mod: ORL | Performed by: FAMILY MEDICINE

## 2024-07-09 PROCEDURE — 84295 ASSAY OF SERUM SODIUM: CPT | Mod: ORL | Performed by: FAMILY MEDICINE

## 2024-07-09 PROCEDURE — 86780 TREPONEMA PALLIDUM: CPT | Mod: ORL | Performed by: FAMILY MEDICINE

## 2024-07-09 PROCEDURE — 82465 ASSAY BLD/SERUM CHOLESTEROL: CPT | Mod: ORL | Performed by: FAMILY MEDICINE

## 2024-07-09 SDOH — HEALTH STABILITY: PHYSICAL HEALTH: ON AVERAGE, HOW MANY MINUTES DO YOU ENGAGE IN EXERCISE AT THIS LEVEL?: 20 MIN

## 2024-07-09 SDOH — HEALTH STABILITY: PHYSICAL HEALTH: ON AVERAGE, HOW MANY DAYS PER WEEK DO YOU ENGAGE IN MODERATE TO STRENUOUS EXERCISE (LIKE A BRISK WALK)?: 3 DAYS

## 2024-07-09 ASSESSMENT — ANXIETY QUESTIONNAIRES
5. BEING SO RESTLESS THAT IT IS HARD TO SIT STILL: NOT AT ALL
6. BECOMING EASILY ANNOYED OR IRRITABLE: NOT AT ALL
IF YOU CHECKED OFF ANY PROBLEMS ON THIS QUESTIONNAIRE, HOW DIFFICULT HAVE THESE PROBLEMS MADE IT FOR YOU TO DO YOUR WORK, TAKE CARE OF THINGS AT HOME, OR GET ALONG WITH OTHER PEOPLE: SOMEWHAT DIFFICULT
GAD7 TOTAL SCORE: 7
8. IF YOU CHECKED OFF ANY PROBLEMS, HOW DIFFICULT HAVE THESE MADE IT FOR YOU TO DO YOUR WORK, TAKE CARE OF THINGS AT HOME, OR GET ALONG WITH OTHER PEOPLE?: SOMEWHAT DIFFICULT
1. FEELING NERVOUS, ANXIOUS, OR ON EDGE: MORE THAN HALF THE DAYS
7. FEELING AFRAID AS IF SOMETHING AWFUL MIGHT HAPPEN: SEVERAL DAYS
GAD7 TOTAL SCORE: 7
7. FEELING AFRAID AS IF SOMETHING AWFUL MIGHT HAPPEN: SEVERAL DAYS
4. TROUBLE RELAXING: SEVERAL DAYS
GAD7 TOTAL SCORE: 7
3. WORRYING TOO MUCH ABOUT DIFFERENT THINGS: MORE THAN HALF THE DAYS
2. NOT BEING ABLE TO STOP OR CONTROL WORRYING: SEVERAL DAYS

## 2024-07-09 ASSESSMENT — PATIENT HEALTH QUESTIONNAIRE - PHQ9
10. IF YOU CHECKED OFF ANY PROBLEMS, HOW DIFFICULT HAVE THESE PROBLEMS MADE IT FOR YOU TO DO YOUR WORK, TAKE CARE OF THINGS AT HOME, OR GET ALONG WITH OTHER PEOPLE: SOMEWHAT DIFFICULT
SUM OF ALL RESPONSES TO PHQ QUESTIONS 1-9: 8
SUM OF ALL RESPONSES TO PHQ QUESTIONS 1-9: 8

## 2024-07-09 ASSESSMENT — SOCIAL DETERMINANTS OF HEALTH (SDOH): HOW OFTEN DO YOU GET TOGETHER WITH FRIENDS OR RELATIVES?: TWICE A WEEK

## 2024-07-09 NOTE — PROGRESS NOTES
Preventive Care Visit  [facility]  Memo Vargas MD, Family Medicine  Jul 9, 2024      Assessment & Plan   Problem List Items Addressed This Visit          Other    History of broken nose    Relevant Orders    Adult ENT  Referral     Other Visit Diagnoses       Wellness examination    -  Primary    Lipid screening        Relevant Orders    Lipid Profile    Screening for metabolic disorder        Relevant Orders    Basic metabolic panel    Screen for STD (sexually transmitted disease)        Relevant Orders    Chlamydia trachomatis/Neisseria gonorrhoeae by PCR - Clinic Collect    HIV Antigen Antibody Combo    Treponema Abs w Reflex to RPR and Titer           Routine labs and STD screening as noted above.     Medical form for employer. Will wait for lab results and then plan to contact patient to either come by and  the form or we can mail it to him.      Patient has been advised of split billing requirements and indicates understanding: Yes       Nicotine/Tobacco Cessation  He reports that he has been smoking vaping device. He has never used smokeless tobacco.  Nicotine/Tobacco Cessation Plan  Information offered: Patient not interested at this time      Counseling  Appropriate preventive services were discussed with this patient, including applicable screening as appropriate for fall prevention, nutrition, physical activity, Tobacco-use cessation, weight loss and cognition.  Checklist reviewing preventive services available has been given to the patient.  Reviewed patient's diet, addressing concerns and/or questions.   He is at risk for lack of exercise and has been provided with information to increase physical activity for the benefit of his well-being.   The patient reports drinking more than 3 alcoholic drinks per day and/or more than 7 drhnks per week. The patient was counseled and given information about possible harmful effects of excessive alcohol intake.The patient's PHQ-9 score is  consistent with mild depression. He was provided with information regarding depression.     Memo Vargas MD  4:08 PM, July 9, 2024        Rick Mckeon is a 33 year old, presenting for the following:  Physical (Preventative and work forms. Blood work including cholesterol.)       HPI  # Health Maintenance  - HIV Screening: pending  - STI Screening: pending  - Hep C Screening: no concerns  - BP:   BP Readings from Last 3 Encounters:   07/09/24 123/73   02/11/24 109/67   07/20/23 129/69   - Cholesterol: pending  Recent Labs   Lab Test 07/20/23  1339   CHOL 207*   HDL 51   *   TRIG 77   The ASCVD Risk score (Brain LUNDBERG, et al., 2019) failed to calculate for the following reasons:    The 2019 ASCVD risk score is only valid for ages 40 to 79  - Diabetes Screening: pending  - Lung Cancer Screening: not indicated  55-79yo w/30py smoking history and currently smoking OR quit within past 15 years:  Low dose CT annually and discontinued once a person has been 15 years tobacco free  - (+) seatbelt use, (+) helmet, (+) smoke detector  - Feels safe at home, denies verbal/physical/emotional abuse in past year: yes      Additional Concerns  History of broken nose, multiple times  - wonders if deviated septum could be responsible for respiratory complications/infections  - would like to meet with ENT for further discussion/assessment           7/9/2024   General Health   How would you rate your overall physical health? (!) FAIR   Feel stress (tense, anxious, or unable to sleep) To some extent      (!) STRESS CONCERN      7/9/2024   Nutrition   Three or more servings of calcium each day? (!) NO   Diet: Regular (no restrictions)   How many servings of fruit and vegetables per day? (!) 2-3   How many sweetened beverages each day? 0-1            7/9/2024   Exercise   Days per week of moderate/strenous exercise 3 days   Average minutes spent exercising at this level 20 min            7/9/2024   Social Factors   Frequency of  gathering with friends or relatives Twice a week   Worry food won't last until get money to buy more No   Food not last or not have enough money for food? No   Do you have housing? (Housing is defined as stable permanent housing and does not include staying ouside in a car, in a tent, in an abandoned building, in an overnight shelter, or couch-surfing.) Yes   Are you worried about losing your housing? No   Lack of transportation? No   Unable to get utilities (heat,electricity)? No            7/9/2024   Dental   Dentist two times every year? Yes            7/9/2024   TB Screening   Were you born outside of the [place]? No          Today's PHQ-9 Score:       7/9/2024    11:50 AM   PHQ-9 SCORE   PHQ-9 Total Score MyChart 8 (Mild depression)   PHQ-9 Total Score 8         7/9/2024   Substance Use   Alcohol more than 3/day or more than 7/wk Yes   How often do you have a drink containing alcohol 2 to 3 times a week   How many alcohol drinks on typical day 3 or 4   How often do you have 5+ drinks at one occasion Monthly   Audit 2/3 Score 3   How often not able to stop drinking once started Never   How often failed to do what normally expected Never   How often needed first drink in am after a heavy drinking session Never   How often feeling of guilt or remorse after drinking Never   How often unable to remember what happened the night before Never   Have you or someone else been injured because of your drinking No   Has anyone been concerned or suggested you cut down on drinking No   TOTAL SCORE - AUDIT 6   Do you use any other substances recreationally? (!) ALCOHOL    (!) CANNABIS PRODUCTS       Multiple values from one day are sorted in reverse-chronological order     Social History     Tobacco Use    Smoking status: Every Day     Types: Vaping Device    Smokeless tobacco: Never    Tobacco comments:     E-cigarettes   Vaping Use    Vaping status: Every Day    Substances: Nicotine   Substance Use Topics    Alcohol use: Yes      "Alcohol/week: 7.0 - 10.0 standard drinks of alcohol     Types: 7 - 10 Standard drinks or equivalent per week    Drug use: Yes     Types: Marijuana           7/9/2024   STI Screening   New sexual partner(s) since last STI/HIV test? (!) YES testing as ordered             7/9/2024   Contraception/Family Planning   Questions about contraception or family planning No        Past Medical History:   Diagnosis Date    Anxiety and depression      Past Surgical History:   Procedure Laterality Date    RHINOPLASTY Bilateral 2008     Family History   Problem Relation Age of Onset    Lung Cancer Maternal Grandmother     Lung Cancer Paternal Grandmother     Thyroid Cancer Sister     Hyperlipidemia Mother     Hyperlipidemia Father     Thyroid Cancer Paternal Aunt     Diabetes No family hx of          Review of Systems  Constitutional, HEENT, cardiovascular, pulmonary, gi and gu systems are negative, except as otherwise noted.     Objective    Exam  /73 (BP Location: Left arm, Patient Position: Sitting, Cuff Size: Adult Regular)   Pulse 66   Temp 98.5  F (36.9  C) (Skin)   Resp 16   Ht 1.854 m (6' 1\")   Wt 77.6 kg (171 lb)   SpO2 99%   BMI 22.56 kg/m     Estimated body mass index is 22.56 kg/m  as calculated from the following:    Height as of this encounter: 1.854 m (6' 1\").    Weight as of this encounter: 77.6 kg (171 lb).    Physical Exam  GENERAL: alert and no distress  HENT: ear canals and TM's normal, nose and mouth without ulcers or lesions  NECK: no adenopathy, no asymmetry, masses, or scars  RESP: lungs clear to auscultation - no rales, rhonchi or wheezes  CV: regular rate and rhythm, normal S1 S2, no S3 or S4, no murmur, click or rub, no peripheral edema  MS: no gross musculoskeletal defects noted, no edema      Signed Electronically by: Memo Vargas MD    Answers submitted by the patient for this visit:  Patient Health Questionnaire (Submitted on 7/9/2024)  If you checked off any problems, how difficult " have these problems made it for you to do your work, take care of things at home, or get along with other people?: Somewhat difficult  PHQ9 TOTAL SCORE: 8  FELICIA-7 (Submitted on 7/9/2024)  FELICIA 7 TOTAL SCORE: 7

## 2024-07-09 NOTE — NURSING NOTE
"33 year old  Chief Complaint   Patient presents with    Physical     Preventative and work forms. Blood work including cholesterol.       Blood pressure 123/73, pulse 66, temperature 98.5  F (36.9  C), temperature source Skin, resp. rate 16, height 1.854 m (6' 1\"), weight 77.6 kg (171 lb), SpO2 99%. Body mass index is 22.56 kg/m .  Patient Active Problem List   Diagnosis    Anxiety and depression    Keratoconus of both eyes       Wt Readings from Last 2 Encounters:   07/09/24 77.6 kg (171 lb)   02/11/24 80.4 kg (177 lb 4.8 oz)     BP Readings from Last 3 Encounters:   07/09/24 123/73   02/11/24 109/67   07/20/23 129/69         Current Outpatient Medications   Medication Sig Dispense Refill    ALPRAZolam (XANAX) 0.25 MG tablet Take 1 tablet (0.25 mg) by mouth 3 times daily as needed for anxiety 10 tablet 0    citalopram (CELEXA) 20 MG tablet Take 1 tablet (20 mg) by mouth daily 90 tablet 0    ibuprofen (ADVIL/MOTRIN) 800 MG tablet Take 1 tablet (800 mg) by mouth every 8 hours as needed for moderate pain 35 tablet 0    sildenafil (VIAGRA) 25 MG tablet Take 1 tablet (25 mg) by mouth daily as needed (erectile dysfunction) (Patient not taking: Reported on 2/11/2024) 10 tablet 0    tiZANidine (ZANAFLEX) 4 MG tablet Take 1 tablet (4 mg) by mouth 3 times daily 25 tablet 0    triamcinolone (KENALOG) 0.1 % external cream Apply topically 2 times daily (Patient not taking: Reported on 2/11/2024) 30 g 0     No current facility-administered medications for this visit.       Social History     Tobacco Use    Smoking status: Every Day     Types: Vaping Device    Smokeless tobacco: Never    Tobacco comments:     E-cigarettes   Vaping Use    Vaping status: Every Day    Substances: Nicotine   Substance Use Topics    Alcohol use: Yes     Alcohol/week: 7.0 - 10.0 standard drinks of alcohol     Types: 7 - 10 Standard drinks or equivalent per week    Drug use: Yes     Types: Marijuana       Health Maintenance Due   Topic Date Due    " "DEPRESSION ACTION PLAN  Never done    Pneumococcal Vaccine: Pediatrics (0 to 5 Years) and At-Risk Patients (6 to 64 Years) (1 of 2 - PCV) Never done    HEPATITIS C SCREENING  Never done    NICOTINE/TOBACCO CESSATION COUNSELING Q 1 YR  07/20/2024    YEARLY PREVENTIVE VISIT  07/20/2024       No results found for: \"PAP\"      July 9, 2024 3:30 PM   "

## 2024-07-10 LAB
ANION GAP SERPL CALCULATED.3IONS-SCNC: 13 MMOL/L (ref 7–15)
BUN SERPL-MCNC: 9.7 MG/DL (ref 6–20)
C TRACH DNA SPEC QL PROBE+SIG AMP: NEGATIVE
CALCIUM SERPL-MCNC: 9.9 MG/DL (ref 8.6–10)
CHLORIDE SERPL-SCNC: 103 MMOL/L (ref 98–107)
CHOLEST SERPL-MCNC: 250 MG/DL
CREAT SERPL-MCNC: 1.05 MG/DL (ref 0.67–1.17)
DEPRECATED HCO3 PLAS-SCNC: 27 MMOL/L (ref 22–29)
EGFRCR SERPLBLD CKD-EPI 2021: >90 ML/MIN/1.73M2
FASTING STATUS PATIENT QL REPORTED: YES
FASTING STATUS PATIENT QL REPORTED: YES
GLUCOSE SERPL-MCNC: 98 MG/DL (ref 70–99)
HDLC SERPL-MCNC: 66 MG/DL
HIV 1+2 AB+HIV1 P24 AG SERPL QL IA: NONREACTIVE
LDLC SERPL CALC-MCNC: 166 MG/DL
N GONORRHOEA DNA SPEC QL NAA+PROBE: NEGATIVE
NONHDLC SERPL-MCNC: 184 MG/DL
POTASSIUM SERPL-SCNC: 4.2 MMOL/L (ref 3.4–5.3)
SODIUM SERPL-SCNC: 143 MMOL/L (ref 135–145)
T PALLIDUM AB SER QL: NONREACTIVE
TRIGL SERPL-MCNC: 89 MG/DL

## 2024-10-12 ENCOUNTER — MYC REFILL (OUTPATIENT)
Dept: FAMILY MEDICINE | Facility: CLINIC | Age: 34
End: 2024-10-12

## 2024-10-12 DIAGNOSIS — F41.9 ANXIETY: ICD-10-CM

## 2024-10-14 DIAGNOSIS — F41.9 ANXIETY: ICD-10-CM

## 2024-10-14 RX ORDER — CITALOPRAM HYDROBROMIDE 20 MG/1
20 TABLET ORAL DAILY
Qty: 90 TABLET | Refills: 0 | Status: SHIPPED | OUTPATIENT
Start: 2024-10-14

## 2024-10-14 RX ORDER — ALPRAZOLAM 0.25 MG/1
0.25 TABLET ORAL 3 TIMES DAILY PRN
Qty: 10 TABLET | Refills: 0 | Status: SHIPPED | OUTPATIENT
Start: 2024-10-14

## 2024-10-14 NOTE — TELEPHONE ENCOUNTER
Medication requested: ALPRAZolam (XANAX) 0.25 MG tablet   Last office visit: 7/9/24  [facility] appointments: none  Medication last refilled: 1/17/24; 10 + 0 refills, never filled or sold to pt per   Last qualifying labs:    7/9/2024  11:50 AM   PHQ-9 / FELICIA-7 Scores 8/2015 to present    FELICIA-7 Score DocFlow 7      Routing refill request to provider for review/approval because:  Drug not on the G refill protocol     Oscar GRACE, RN  10/14/24 9:48 AM

## 2024-10-14 NOTE — TELEPHONE ENCOUNTER
reviewed, med refilled as requested.     Mike was seen today for refill request.    Diagnoses and all orders for this visit:    Anxiety  -     ALPRAZolam (XANAX) 0.25 MG tablet; Take 1 tablet (0.25 mg) by mouth 3 times daily as needed for anxiety.      Memo Vargas MD  10:31 AM, October 14, 2024

## 2024-10-14 NOTE — TELEPHONE ENCOUNTER
Medication requested: citalopram (CELEXA) 20 MG tablet   Last office visit: 7/9/24  [facility] appointments: none  Medication last refilled: 7/1/24; 90 + 0 refills  Last qualifying labs:    7/9/2024  11:50 AM   PHQ-9 / FELICIA-7 Scores 8/2015 to present    FELICIA-7 Score DocFlow 7       7/9/2024  11:50 AM   PHQ-9 / FELICIA-7 Scores 8/2015 to present    PHQ-9 Score DocFlow 8      Prescription approved per [facility] Refill Protocol.    Oscar GRACE, RN  10/14/24 4:24 PM

## 2024-12-31 ENCOUNTER — MYC MEDICAL ADVICE (OUTPATIENT)
Dept: FAMILY MEDICINE | Facility: CLINIC | Age: 34
End: 2024-12-31

## 2024-12-31 DIAGNOSIS — F41.9 ANXIETY: ICD-10-CM

## 2024-12-31 RX ORDER — CITALOPRAM HYDROBROMIDE 20 MG/1
20 TABLET ORAL DAILY
Qty: 90 TABLET | Refills: 3 | Status: SHIPPED | OUTPATIENT
Start: 2024-12-31

## 2024-12-31 RX ORDER — CITALOPRAM HYDROBROMIDE 20 MG/1
20 TABLET ORAL DAILY
Qty: 30 TABLET | Refills: 0 | Status: SHIPPED | OUTPATIENT
Start: 2024-12-31 | End: 2024-12-31

## 2024-12-31 NOTE — TELEPHONE ENCOUNTER
correspondence, med refilled as noted.     Diagnoses and all orders for this visit:    Anxiety  -     citalopram (CELEXA) 20 MG tablet; Take 1 tablet (20 mg) by mouth daily.      Memo Vargas MD  2:47 PM, December 31, 2024

## 2024-12-31 NOTE — TELEPHONE ENCOUNTER
Medication requested: citalopram (CELEXA) 20 MG tablet   Last office visit: 7/9/24  [facility] appointments: none  Medication last refilled: 10/14/24; 90 + 0 refills  Last qualifying labs:    7/9/2024  11:50 AM   PHQ-9 / FELICIA-7 Scores 8/2015 to present    FELICIA-7 Score DocFlow 7       7/9/2024  11:50 AM   PHQ-9 / FELICIA-7 Scores 8/2015 to present    PHQ-9 Score DocFlow 8      Prescription approved per [facility] Refill Protocol.    Oscar GRACE, RN  12/31/24 1:19 PM

## 2025-08-23 ENCOUNTER — HEALTH MAINTENANCE LETTER (OUTPATIENT)
Age: 35
End: 2025-08-23